# Patient Record
Sex: FEMALE | Race: WHITE
[De-identification: names, ages, dates, MRNs, and addresses within clinical notes are randomized per-mention and may not be internally consistent; named-entity substitution may affect disease eponyms.]

---

## 2020-01-21 PROBLEM — Z00.00 ENCOUNTER FOR PREVENTIVE HEALTH EXAMINATION: Status: ACTIVE | Noted: 2020-01-21

## 2020-02-11 ENCOUNTER — NON-APPOINTMENT (OUTPATIENT)
Age: 30
End: 2020-02-11

## 2020-02-11 ENCOUNTER — APPOINTMENT (OUTPATIENT)
Dept: OBGYN | Facility: CLINIC | Age: 30
End: 2020-02-11
Payer: COMMERCIAL

## 2020-02-11 VITALS
WEIGHT: 169 LBS | OXYGEN SATURATION: 98 % | DIASTOLIC BLOOD PRESSURE: 80 MMHG | BODY MASS INDEX: 27.16 KG/M2 | HEIGHT: 66 IN | SYSTOLIC BLOOD PRESSURE: 118 MMHG | RESPIRATION RATE: 18 BRPM | HEART RATE: 71 BPM

## 2020-02-11 PROCEDURE — 81003 URINALYSIS AUTO W/O SCOPE: CPT | Mod: QW

## 2020-02-11 PROCEDURE — 0501F PRENATAL FLOW SHEET: CPT

## 2020-02-12 LAB
APPEARANCE: ABNORMAL
BACTERIA: ABNORMAL
BILIRUB UR QL STRIP: NORMAL
BILIRUBIN URINE: NEGATIVE
BLOOD URINE: NEGATIVE
CLARITY UR: CLEAR
COLLECTION METHOD: NORMAL
COLOR: NORMAL
GLUCOSE QUALITATIVE U: NEGATIVE
GLUCOSE UR-MCNC: NORMAL
HCG UR QL: 0.2 EU/DL
HGB UR QL STRIP.AUTO: NORMAL
HYALINE CASTS: 0 /LPF
KETONES UR-MCNC: NORMAL
KETONES URINE: NEGATIVE
LEUKOCYTE ESTERASE UR QL STRIP: NORMAL
LEUKOCYTE ESTERASE URINE: NEGATIVE
MICROSCOPIC-UA: NORMAL
NITRITE UR QL STRIP: NORMAL
NITRITE URINE: NEGATIVE
PH UR STRIP: 6
PH URINE: 6
PROT UR STRIP-MCNC: NORMAL
PROTEIN URINE: NEGATIVE
RED BLOOD CELLS URINE: 4 /HPF
SP GR UR STRIP: 1.01
SPECIFIC GRAVITY URINE: 1.01
SQUAMOUS EPITHELIAL CELLS: 2 /HPF
UROBILINOGEN URINE: NORMAL
WHITE BLOOD CELLS URINE: 31 /HPF

## 2020-02-13 ENCOUNTER — TRANSCRIPTION ENCOUNTER (OUTPATIENT)
Age: 30
End: 2020-02-13

## 2020-02-13 LAB — BACTERIA UR CULT: NORMAL

## 2020-02-14 ENCOUNTER — NON-APPOINTMENT (OUTPATIENT)
Age: 30
End: 2020-02-14

## 2020-02-25 ENCOUNTER — NON-APPOINTMENT (OUTPATIENT)
Age: 30
End: 2020-02-25

## 2020-02-25 ENCOUNTER — APPOINTMENT (OUTPATIENT)
Dept: OBGYN | Facility: CLINIC | Age: 30
End: 2020-02-25
Payer: COMMERCIAL

## 2020-02-25 VITALS
HEART RATE: 80 BPM | HEIGHT: 67 IN | WEIGHT: 172.18 LBS | DIASTOLIC BLOOD PRESSURE: 80 MMHG | BODY MASS INDEX: 27.02 KG/M2 | SYSTOLIC BLOOD PRESSURE: 120 MMHG

## 2020-02-25 LAB
BILIRUB UR QL STRIP: NORMAL
CLARITY UR: CLEAR
COLLECTION METHOD: NORMAL
GLUCOSE UR-MCNC: NORMAL
HCG UR QL: 0.2 EU/DL
HGB UR QL STRIP.AUTO: NORMAL
KETONES UR-MCNC: NORMAL
LEUKOCYTE ESTERASE UR QL STRIP: NORMAL
NITRITE UR QL STRIP: NORMAL
PH UR STRIP: 6
PROT UR STRIP-MCNC: NORMAL
SP GR UR STRIP: 1.03

## 2020-02-25 PROCEDURE — 36415 COLL VENOUS BLD VENIPUNCTURE: CPT

## 2020-02-25 PROCEDURE — 81003 URINALYSIS AUTO W/O SCOPE: CPT | Mod: QW

## 2020-02-25 PROCEDURE — 0502F SUBSEQUENT PRENATAL CARE: CPT

## 2020-02-26 ENCOUNTER — TRANSCRIPTION ENCOUNTER (OUTPATIENT)
Age: 30
End: 2020-02-26

## 2020-02-26 LAB
BASOPHILS # BLD AUTO: 0.04 K/UL
BASOPHILS NFR BLD AUTO: 0.4 %
EOSINOPHIL # BLD AUTO: 0.14 K/UL
EOSINOPHIL NFR BLD AUTO: 1.3 %
GLUCOSE 1H P 50 G GLC PO SERPL-MCNC: 97 MG/DL
HCT VFR BLD CALC: 34.5 %
HCV AB SER QL: NONREACTIVE
HCV S/CO RATIO: 0.15 S/CO
HGB BLD-MCNC: 11.3 G/DL
IMM GRANULOCYTES NFR BLD AUTO: 0.3 %
LYMPHOCYTES # BLD AUTO: 1.74 K/UL
LYMPHOCYTES NFR BLD AUTO: 16.1 %
MAN DIFF?: NORMAL
MCHC RBC-ENTMCNC: 31.4 PG
MCHC RBC-ENTMCNC: 32.8 GM/DL
MCV RBC AUTO: 95.8 FL
MONOCYTES # BLD AUTO: 0.67 K/UL
MONOCYTES NFR BLD AUTO: 6.2 %
NEUTROPHILS # BLD AUTO: 8.18 K/UL
NEUTROPHILS NFR BLD AUTO: 75.7 %
PLATELET # BLD AUTO: 235 K/UL
RBC # BLD: 3.6 M/UL
RBC # FLD: 12.7 %
WBC # FLD AUTO: 10.8 K/UL

## 2020-03-24 ENCOUNTER — NON-APPOINTMENT (OUTPATIENT)
Age: 30
End: 2020-03-24

## 2020-03-24 ENCOUNTER — APPOINTMENT (OUTPATIENT)
Dept: OBGYN | Facility: CLINIC | Age: 30
End: 2020-03-24
Payer: COMMERCIAL

## 2020-03-24 VITALS
RESPIRATION RATE: 18 BRPM | HEIGHT: 67 IN | SYSTOLIC BLOOD PRESSURE: 126 MMHG | OXYGEN SATURATION: 98 % | BODY MASS INDEX: 27.47 KG/M2 | DIASTOLIC BLOOD PRESSURE: 70 MMHG | WEIGHT: 175 LBS

## 2020-03-24 PROCEDURE — 81003 URINALYSIS AUTO W/O SCOPE: CPT | Mod: QW

## 2020-03-24 PROCEDURE — 0502F SUBSEQUENT PRENATAL CARE: CPT

## 2020-03-25 LAB
BILIRUB UR QL STRIP: NORMAL
CLARITY UR: CLEAR
COLLECTION METHOD: NORMAL
GLUCOSE UR-MCNC: NORMAL
HCG UR QL: 0.2 EU/DL
HGB UR QL STRIP.AUTO: NORMAL
KETONES UR-MCNC: NORMAL
LEUKOCYTE ESTERASE UR QL STRIP: NORMAL
NITRITE UR QL STRIP: NORMAL
PH UR STRIP: 6.5
PROT UR STRIP-MCNC: NORMAL
SP GR UR STRIP: 1.02

## 2020-04-07 ENCOUNTER — NON-APPOINTMENT (OUTPATIENT)
Age: 30
End: 2020-04-07

## 2020-04-07 ENCOUNTER — APPOINTMENT (OUTPATIENT)
Dept: ANTEPARTUM | Facility: CLINIC | Age: 30
End: 2020-04-07
Payer: COMMERCIAL

## 2020-04-07 ENCOUNTER — APPOINTMENT (OUTPATIENT)
Dept: OBGYN | Facility: CLINIC | Age: 30
End: 2020-04-07
Payer: COMMERCIAL

## 2020-04-07 ENCOUNTER — ASOB RESULT (OUTPATIENT)
Age: 30
End: 2020-04-07

## 2020-04-07 VITALS
DIASTOLIC BLOOD PRESSURE: 82 MMHG | SYSTOLIC BLOOD PRESSURE: 116 MMHG | WEIGHT: 174.8 LBS | HEIGHT: 67 IN | BODY MASS INDEX: 27.44 KG/M2

## 2020-04-07 VITALS — TEMPERATURE: 98.7 F

## 2020-04-07 PROCEDURE — 81003 URINALYSIS AUTO W/O SCOPE: CPT | Mod: QW

## 2020-04-07 PROCEDURE — 0502F SUBSEQUENT PRENATAL CARE: CPT

## 2020-04-07 PROCEDURE — 76816 OB US FOLLOW-UP PER FETUS: CPT

## 2020-04-22 ENCOUNTER — NON-APPOINTMENT (OUTPATIENT)
Age: 30
End: 2020-04-22

## 2020-04-22 ENCOUNTER — APPOINTMENT (OUTPATIENT)
Dept: OBGYN | Facility: CLINIC | Age: 30
End: 2020-04-22
Payer: COMMERCIAL

## 2020-04-22 VITALS
HEIGHT: 67 IN | WEIGHT: 178 LBS | DIASTOLIC BLOOD PRESSURE: 72 MMHG | BODY MASS INDEX: 27.94 KG/M2 | RESPIRATION RATE: 18 BRPM | OXYGEN SATURATION: 98 % | SYSTOLIC BLOOD PRESSURE: 108 MMHG

## 2020-04-22 LAB
BILIRUB UR QL STRIP: NORMAL
CLARITY UR: CLEAR
COLLECTION METHOD: NORMAL
GLUCOSE UR-MCNC: NORMAL
HCG UR QL: 0.2 EU/DL
HGB UR QL STRIP.AUTO: NORMAL
KETONES UR-MCNC: NORMAL
LEUKOCYTE ESTERASE UR QL STRIP: NORMAL
NITRITE UR QL STRIP: NORMAL
PH UR STRIP: 6
PROT UR STRIP-MCNC: NORMAL
SP GR UR STRIP: 1.02

## 2020-04-22 PROCEDURE — 0502F SUBSEQUENT PRENATAL CARE: CPT

## 2020-04-22 PROCEDURE — 81003 URINALYSIS AUTO W/O SCOPE: CPT | Mod: QW

## 2020-05-07 ENCOUNTER — APPOINTMENT (OUTPATIENT)
Dept: OBGYN | Facility: CLINIC | Age: 30
End: 2020-05-07
Payer: COMMERCIAL

## 2020-05-07 ENCOUNTER — NON-APPOINTMENT (OUTPATIENT)
Age: 30
End: 2020-05-07

## 2020-05-07 VITALS
HEIGHT: 67 IN | BODY MASS INDEX: 28.09 KG/M2 | SYSTOLIC BLOOD PRESSURE: 110 MMHG | DIASTOLIC BLOOD PRESSURE: 64 MMHG | WEIGHT: 179 LBS

## 2020-05-07 LAB
BILIRUB UR QL STRIP: NORMAL
CLARITY UR: CLEAR
COLLECTION METHOD: NORMAL
GLUCOSE UR-MCNC: NORMAL
HCG UR QL: 0.2 EU/DL
HGB UR QL STRIP.AUTO: NORMAL
KETONES UR-MCNC: NORMAL
LEUKOCYTE ESTERASE UR QL STRIP: NORMAL
NITRITE UR QL STRIP: NORMAL
PH UR STRIP: 5.5
PROT UR STRIP-MCNC: NORMAL
SP GR UR STRIP: 1.03

## 2020-05-07 PROCEDURE — 0502F SUBSEQUENT PRENATAL CARE: CPT

## 2020-05-07 PROCEDURE — 81003 URINALYSIS AUTO W/O SCOPE: CPT | Mod: QW

## 2020-05-07 PROCEDURE — 36415 COLL VENOUS BLD VENIPUNCTURE: CPT

## 2020-05-08 LAB
BASOPHILS # BLD AUTO: 0.04 K/UL
BASOPHILS NFR BLD AUTO: 0.4 %
EOSINOPHIL # BLD AUTO: 0.07 K/UL
EOSINOPHIL NFR BLD AUTO: 0.7 %
HCT VFR BLD CALC: 36.8 %
HGB BLD-MCNC: 11.1 G/DL
HIV1+2 AB SPEC QL IA.RAPID: NONREACTIVE
IMM GRANULOCYTES NFR BLD AUTO: 0.4 %
LYMPHOCYTES # BLD AUTO: 1.89 K/UL
LYMPHOCYTES NFR BLD AUTO: 19.7 %
MAN DIFF?: NORMAL
MCHC RBC-ENTMCNC: 28.5 PG
MCHC RBC-ENTMCNC: 30.2 GM/DL
MCV RBC AUTO: 94.4 FL
MONOCYTES # BLD AUTO: 0.45 K/UL
MONOCYTES NFR BLD AUTO: 4.7 %
NEUTROPHILS # BLD AUTO: 7.08 K/UL
NEUTROPHILS NFR BLD AUTO: 74.1 %
PLATELET # BLD AUTO: 244 K/UL
RBC # BLD: 3.9 M/UL
RBC # FLD: 13.1 %
WBC # FLD AUTO: 9.57 K/UL

## 2020-05-11 LAB
GP B STREP DNA SPEC QL NAA+PROBE: NORMAL
GP B STREP DNA SPEC QL NAA+PROBE: NOT DETECTED
SOURCE GBS: NORMAL

## 2020-05-15 ENCOUNTER — APPOINTMENT (OUTPATIENT)
Dept: OBGYN | Facility: CLINIC | Age: 30
End: 2020-05-15
Payer: COMMERCIAL

## 2020-05-15 ENCOUNTER — NON-APPOINTMENT (OUTPATIENT)
Age: 30
End: 2020-05-15

## 2020-05-15 VITALS
SYSTOLIC BLOOD PRESSURE: 110 MMHG | BODY MASS INDEX: 28.72 KG/M2 | DIASTOLIC BLOOD PRESSURE: 70 MMHG | WEIGHT: 183 LBS | HEIGHT: 67 IN

## 2020-05-15 DIAGNOSIS — Z34.03 ENCOUNTER FOR SUPERVISION OF NORMAL FIRST PREGNANCY, THIRD TRIMESTER: ICD-10-CM

## 2020-05-15 PROCEDURE — 0502F SUBSEQUENT PRENATAL CARE: CPT

## 2020-05-15 PROCEDURE — 81003 URINALYSIS AUTO W/O SCOPE: CPT | Mod: QW

## 2020-05-21 ENCOUNTER — NON-APPOINTMENT (OUTPATIENT)
Age: 30
End: 2020-05-21

## 2020-05-21 ENCOUNTER — APPOINTMENT (OUTPATIENT)
Dept: OBGYN | Facility: CLINIC | Age: 30
End: 2020-05-21
Payer: COMMERCIAL

## 2020-05-21 VITALS
SYSTOLIC BLOOD PRESSURE: 122 MMHG | DIASTOLIC BLOOD PRESSURE: 70 MMHG | BODY MASS INDEX: 28.72 KG/M2 | HEIGHT: 67 IN | WEIGHT: 183 LBS

## 2020-05-21 PROCEDURE — 0502F SUBSEQUENT PRENATAL CARE: CPT

## 2020-05-28 ENCOUNTER — APPOINTMENT (OUTPATIENT)
Dept: OBGYN | Facility: CLINIC | Age: 30
End: 2020-05-28
Payer: COMMERCIAL

## 2020-05-28 ENCOUNTER — NON-APPOINTMENT (OUTPATIENT)
Age: 30
End: 2020-05-28

## 2020-05-28 VITALS
BODY MASS INDEX: 28.88 KG/M2 | SYSTOLIC BLOOD PRESSURE: 110 MMHG | DIASTOLIC BLOOD PRESSURE: 80 MMHG | HEIGHT: 67 IN | WEIGHT: 184 LBS

## 2020-05-28 PROCEDURE — 0502F SUBSEQUENT PRENATAL CARE: CPT

## 2020-06-03 ENCOUNTER — INPATIENT (INPATIENT)
Facility: HOSPITAL | Age: 30
LOS: 1 days | Discharge: HOME | End: 2020-06-05
Attending: OBSTETRICS & GYNECOLOGY | Admitting: OBSTETRICS & GYNECOLOGY
Payer: COMMERCIAL

## 2020-06-03 ENCOUNTER — OUTPATIENT (OUTPATIENT)
Dept: INPATIENT UNIT | Facility: HOSPITAL | Age: 30
LOS: 1 days | Discharge: HOME | End: 2020-06-03
Payer: COMMERCIAL

## 2020-06-03 VITALS
TEMPERATURE: 99 F | SYSTOLIC BLOOD PRESSURE: 118 MMHG | DIASTOLIC BLOOD PRESSURE: 65 MMHG | RESPIRATION RATE: 16 BRPM | HEART RATE: 92 BPM

## 2020-06-03 DIAGNOSIS — O26.899 OTHER SPECIFIED PREGNANCY RELATED CONDITIONS, UNSPECIFIED TRIMESTER: ICD-10-CM

## 2020-06-03 DIAGNOSIS — O47.9 FALSE LABOR, UNSPECIFIED: ICD-10-CM

## 2020-06-03 LAB
BASOPHILS # BLD AUTO: 0.06 K/UL — SIGNIFICANT CHANGE UP (ref 0–0.2)
BASOPHILS NFR BLD AUTO: 0.3 % — SIGNIFICANT CHANGE UP (ref 0–2)
EOSINOPHIL # BLD AUTO: 0 K/UL — SIGNIFICANT CHANGE UP (ref 0–0.5)
EOSINOPHIL NFR BLD AUTO: 0 % — SIGNIFICANT CHANGE UP (ref 0–6)
HCT VFR BLD CALC: 35.1 % — SIGNIFICANT CHANGE UP (ref 34.5–45)
HGB BLD-MCNC: 11.6 G/DL — SIGNIFICANT CHANGE UP (ref 11.5–15.5)
IMM GRANULOCYTES NFR BLD AUTO: 0.5 % — SIGNIFICANT CHANGE UP (ref 0–1.5)
LYMPHOCYTES # BLD AUTO: 1.45 K/UL — SIGNIFICANT CHANGE UP (ref 1–3.3)
LYMPHOCYTES # BLD AUTO: 7.6 % — LOW (ref 13–44)
MCHC RBC-ENTMCNC: 28.2 PG — SIGNIFICANT CHANGE UP (ref 27–34)
MCHC RBC-ENTMCNC: 33 GM/DL — SIGNIFICANT CHANGE UP (ref 32–36)
MCV RBC AUTO: 85.4 FL — SIGNIFICANT CHANGE UP (ref 80–100)
MONOCYTES # BLD AUTO: 0.66 K/UL — SIGNIFICANT CHANGE UP (ref 0–0.9)
MONOCYTES NFR BLD AUTO: 3.4 % — SIGNIFICANT CHANGE UP (ref 2–14)
NEUTROPHILS # BLD AUTO: 16.9 K/UL — HIGH (ref 1.8–7.4)
NEUTROPHILS NFR BLD AUTO: 88.2 % — HIGH (ref 43–77)
PLATELET # BLD AUTO: 259 K/UL — SIGNIFICANT CHANGE UP (ref 150–400)
RBC # BLD: 4.11 M/UL — SIGNIFICANT CHANGE UP (ref 3.8–5.2)
RBC # FLD: 13.3 % — SIGNIFICANT CHANGE UP (ref 10.3–14.5)
SARS-COV-2 RNA SPEC QL NAA+PROBE: SIGNIFICANT CHANGE UP
T PALLIDUM AB TITR SER: NEGATIVE — SIGNIFICANT CHANGE UP
WBC # BLD: 19.16 K/UL — HIGH (ref 3.8–10.5)
WBC # FLD AUTO: 19.16 K/UL — HIGH (ref 3.8–10.5)

## 2020-06-03 PROCEDURE — 86901 BLOOD TYPING SEROLOGIC RH(D): CPT

## 2020-06-03 PROCEDURE — G0463: CPT

## 2020-06-03 PROCEDURE — 36415 COLL VENOUS BLD VENIPUNCTURE: CPT

## 2020-06-03 PROCEDURE — 85018 HEMOGLOBIN: CPT

## 2020-06-03 PROCEDURE — 59050 FETAL MONITOR W/REPORT: CPT

## 2020-06-03 PROCEDURE — 86780 TREPONEMA PALLIDUM: CPT

## 2020-06-03 PROCEDURE — 86900 BLOOD TYPING SEROLOGIC ABO: CPT

## 2020-06-03 PROCEDURE — 59400 OBSTETRICAL CARE: CPT

## 2020-06-03 PROCEDURE — 86850 RBC ANTIBODY SCREEN: CPT

## 2020-06-03 PROCEDURE — 85014 HEMATOCRIT: CPT

## 2020-06-03 PROCEDURE — U0003: CPT

## 2020-06-03 PROCEDURE — 85025 COMPLETE CBC W/AUTO DIFF WBC: CPT

## 2020-06-03 PROCEDURE — 59410 OBSTETRICAL CARE: CPT

## 2020-06-03 RX ORDER — IBUPROFEN 200 MG
600 TABLET ORAL EVERY 6 HOURS
Refills: 0 | Status: COMPLETED | OUTPATIENT
Start: 2020-06-03 | End: 2021-05-02

## 2020-06-03 RX ORDER — SIMETHICONE 80 MG/1
80 TABLET, CHEWABLE ORAL EVERY 4 HOURS
Refills: 0 | Status: DISCONTINUED | OUTPATIENT
Start: 2020-06-03 | End: 2020-06-05

## 2020-06-03 RX ORDER — SODIUM CHLORIDE 9 MG/ML
3 INJECTION INTRAMUSCULAR; INTRAVENOUS; SUBCUTANEOUS EVERY 8 HOURS
Refills: 0 | Status: DISCONTINUED | OUTPATIENT
Start: 2020-06-03 | End: 2020-06-03

## 2020-06-03 RX ORDER — MAGNESIUM HYDROXIDE 400 MG/1
30 TABLET, CHEWABLE ORAL
Refills: 0 | Status: DISCONTINUED | OUTPATIENT
Start: 2020-06-03 | End: 2020-06-05

## 2020-06-03 RX ORDER — TETANUS TOXOID, REDUCED DIPHTHERIA TOXOID AND ACELLULAR PERTUSSIS VACCINE, ADSORBED 5; 2.5; 8; 8; 2.5 [IU]/.5ML; [IU]/.5ML; UG/.5ML; UG/.5ML; UG/.5ML
0.5 SUSPENSION INTRAMUSCULAR ONCE
Refills: 0 | Status: DISCONTINUED | OUTPATIENT
Start: 2020-06-03 | End: 2020-06-05

## 2020-06-03 RX ORDER — OXYCODONE HYDROCHLORIDE 5 MG/1
5 TABLET ORAL ONCE
Refills: 0 | Status: DISCONTINUED | OUTPATIENT
Start: 2020-06-03 | End: 2020-06-05

## 2020-06-03 RX ORDER — LANOLIN
1 OINTMENT (GRAM) TOPICAL EVERY 6 HOURS
Refills: 0 | Status: DISCONTINUED | OUTPATIENT
Start: 2020-06-03 | End: 2020-06-05

## 2020-06-03 RX ORDER — ACETAMINOPHEN 500 MG
975 TABLET ORAL
Refills: 0 | Status: DISCONTINUED | OUTPATIENT
Start: 2020-06-03 | End: 2020-06-05

## 2020-06-03 RX ORDER — OXYTOCIN 10 UNIT/ML
333.33 VIAL (ML) INJECTION
Qty: 20 | Refills: 0 | Status: DISCONTINUED | OUTPATIENT
Start: 2020-06-03 | End: 2020-06-05

## 2020-06-03 RX ORDER — OXYCODONE HYDROCHLORIDE 5 MG/1
5 TABLET ORAL
Refills: 0 | Status: DISCONTINUED | OUTPATIENT
Start: 2020-06-03 | End: 2020-06-05

## 2020-06-03 RX ORDER — HYDROCORTISONE 1 %
1 OINTMENT (GRAM) TOPICAL EVERY 6 HOURS
Refills: 0 | Status: DISCONTINUED | OUTPATIENT
Start: 2020-06-03 | End: 2020-06-05

## 2020-06-03 RX ORDER — KETOROLAC TROMETHAMINE 30 MG/ML
30 SYRINGE (ML) INJECTION ONCE
Refills: 0 | Status: DISCONTINUED | OUTPATIENT
Start: 2020-06-03 | End: 2020-06-05

## 2020-06-03 RX ORDER — PRAMOXINE HYDROCHLORIDE 150 MG/15G
1 AEROSOL, FOAM RECTAL EVERY 4 HOURS
Refills: 0 | Status: DISCONTINUED | OUTPATIENT
Start: 2020-06-03 | End: 2020-06-05

## 2020-06-03 RX ORDER — AER TRAVELER 0.5 G/1
1 SOLUTION RECTAL; TOPICAL EVERY 4 HOURS
Refills: 0 | Status: DISCONTINUED | OUTPATIENT
Start: 2020-06-03 | End: 2020-06-05

## 2020-06-03 RX ORDER — IBUPROFEN 200 MG
600 TABLET ORAL EVERY 6 HOURS
Refills: 0 | Status: DISCONTINUED | OUTPATIENT
Start: 2020-06-03 | End: 2020-06-05

## 2020-06-03 RX ORDER — BENZOCAINE 10 %
1 GEL (GRAM) MUCOUS MEMBRANE EVERY 6 HOURS
Refills: 0 | Status: DISCONTINUED | OUTPATIENT
Start: 2020-06-03 | End: 2020-06-05

## 2020-06-03 RX ORDER — SODIUM CHLORIDE 9 MG/ML
1000 INJECTION, SOLUTION INTRAVENOUS
Refills: 0 | Status: DISCONTINUED | OUTPATIENT
Start: 2020-06-03 | End: 2020-06-03

## 2020-06-03 RX ORDER — DIPHENHYDRAMINE HCL 50 MG
25 CAPSULE ORAL EVERY 6 HOURS
Refills: 0 | Status: DISCONTINUED | OUTPATIENT
Start: 2020-06-03 | End: 2020-06-05

## 2020-06-03 RX ORDER — DIBUCAINE 1 %
1 OINTMENT (GRAM) RECTAL EVERY 6 HOURS
Refills: 0 | Status: DISCONTINUED | OUTPATIENT
Start: 2020-06-03 | End: 2020-06-05

## 2020-06-03 RX ORDER — CITRIC ACID/SODIUM CITRATE 300-500 MG
30 SOLUTION, ORAL ORAL ONCE
Refills: 0 | Status: DISCONTINUED | OUTPATIENT
Start: 2020-06-03 | End: 2020-06-03

## 2020-06-03 RX ADMIN — Medication 975 MILLIGRAM(S): at 15:49

## 2020-06-03 RX ADMIN — Medication 1 TABLET(S): at 12:46

## 2020-06-03 RX ADMIN — Medication 1000 MILLIUNIT(S)/MIN: at 05:21

## 2020-06-03 RX ADMIN — Medication 975 MILLIGRAM(S): at 20:52

## 2020-06-03 RX ADMIN — Medication 600 MILLIGRAM(S): at 23:54

## 2020-06-03 RX ADMIN — Medication 600 MILLIGRAM(S): at 12:53

## 2020-06-04 ENCOUNTER — TRANSCRIPTION ENCOUNTER (OUTPATIENT)
Age: 30
End: 2020-06-04

## 2020-06-04 LAB
HCT VFR BLD CALC: 31.5 % — LOW (ref 34.5–45)
HGB BLD-MCNC: 10 G/DL — LOW (ref 11.5–15.5)

## 2020-06-04 RX ADMIN — Medication 975 MILLIGRAM(S): at 21:24

## 2020-06-04 RX ADMIN — Medication 600 MILLIGRAM(S): at 05:52

## 2020-06-04 RX ADMIN — Medication 1 TABLET(S): at 12:55

## 2020-06-04 NOTE — PROGRESS NOTE ADULT - SUBJECTIVE AND OBJECTIVE BOX
Patient is a 30yo  now PPD#1 s/p spontaneous vaginal delivery    S:    No acute events overnight.  Pt seen and examined at bedside. Pt doing well.  Has no complaints.  Pain well controlled on current regiment.  Pt ambulating, tolerating PO, voiding w/o difficulty, +flatus/-BM.    O:    T(C): 36.9 (20 @ 23:58), Max: 37.1 (20 @ 09:34)  HR: 70 (20 @ 23:58) (70 - 96)  BP: 115/77 (20 @ 23:58) (105/67 - 122/80)  RR: 18 (20 @ 23:58) (16 - 18)  SpO2: 96% (20 @ 23:58) (96% - 98%)    Physical Exam  Breast: NT, non-engorged  Abdomen:  soft, NT, ND, BS+  Uterus:  firm below umbilicus  VE:  expectant lochia  Ext:  NT, nonedematous                          11.6   19.16 )-----------( 259      ( 2020 04:20 )             35.1 Patient is a 28yo  now PPD#1 s/p spontaneous vaginal delivery.    S:    No acute events overnight.  Pt seen and examined at bedside. Pt doing well.  Has no complaints.  Pain well controlled on current regiment.  Pt ambulating, tolerating PO, voiding w/o difficulty, +flatus/-BM.    O:    T(C): 36.9 (20 @ 23:58), Max: 37.1 (20 @ 09:34)  HR: 70 (20 @ 23:58) (70 - 96)  BP: 115/77 (20 @ 23:58) (105/67 - 122/80)  RR: 18 (20 @ 23:58) (16 - 18)  SpO2: 96% (20 @ 23:58) (96% - 98%)    Physical Exam  Breast: NT, non-engorged  Abdomen:  soft, NT, ND, BS+  Uterus:  firm below umbilicus  VE:  normal lochia  Ext:  NT, nonedematous                          11.6   19.16 )-----------( 259      ( 2020 04:20 )             35.1

## 2020-06-04 NOTE — PROGRESS NOTE ADULT - ATTENDING COMMENTS
Patient seen and examined at bedside. Agree with above assessment and exam. Doing well. Tolerating diet and ambulation without difficulties. H/H pp stable. Baby boy stable-- pending circ to be done today. D/C patient home tomorrow if stable.

## 2020-06-04 NOTE — PROGRESS NOTE ADULT - SUBJECTIVE AND OBJECTIVE BOX
S: Patient doing well. Minimal lochia. No complaints.     O: Vital Signs Last 24 Hrs  T(C): 37.2 (2020 08:46), Max: 37.2 (2020 08:46)  T(F): 98.9 (2020 08:46), Max: 98.9 (2020 08:46)  HR: 68 (2020 08:46) (68 - 89)  BP: 116/80 (2020 08:46) (105/67 - 116/80)  BP(mean): 85 (2020 09:34) (85 - 85)  RR: 17 (2020 08:46) (17 - 18)  SpO2: 99% (2020 08:46) (96% - 99%)    Gen: NAD  Abd: soft, NT, ND, fundus firm below umbilicus  Perineum: clean,dry and intact    Ext: no tenderness    Labs:                        10.0   x     )-----------( x        ( 2020 08:20 )             31.5        Rubella status:    A: 29y PPD# 1 s/p      Doing well    Plan: continue progressive  care    \par

## 2020-06-05 ENCOUNTER — APPOINTMENT (OUTPATIENT)
Dept: OBGYN | Facility: CLINIC | Age: 30
End: 2020-06-05

## 2020-06-05 VITALS
DIASTOLIC BLOOD PRESSURE: 72 MMHG | TEMPERATURE: 98 F | HEART RATE: 69 BPM | OXYGEN SATURATION: 99 % | SYSTOLIC BLOOD PRESSURE: 108 MMHG | RESPIRATION RATE: 17 BRPM

## 2020-06-05 RX ORDER — IBUPROFEN 200 MG
1 TABLET ORAL
Qty: 28 | Refills: 0
Start: 2020-06-05 | End: 2020-06-11

## 2020-06-05 RX ADMIN — Medication 600 MILLIGRAM(S): at 00:12

## 2020-06-05 RX ADMIN — Medication 600 MILLIGRAM(S): at 06:29

## 2020-06-05 NOTE — DISCHARGE NOTE OB - PATIENT PORTAL LINK FT
You can access the FollowMyHealth Patient Portal offered by [facility] by registering at the following website: [email or web address]. By joining [employer]’s FollowMyHealth portal, you will also be able to view your health information using other applications (apps) compatible with our system.

## 2020-06-05 NOTE — PROGRESS NOTE ADULT - ASSESSMENT
A/P:  Patient is a 28yo  now PPD#2 s/p spontaneous vaginal delivery.  -Stable  -Voiding, tolerating PO, bowel function nml   -Advance care as tolerated   -Continue routine postpartum/postoperative care and education.  -Pt encouraged to increase ambulation and PO intake  -D/C today

## 2020-06-05 NOTE — DISCHARGE NOTE OB - CARE PROVIDER_API CALL
Tyler Stephen OB-GYN  [facility] [address] [place] [address]  Phone: [phone]  Fax: [phone]  Follow Up Time: 1 month

## 2020-06-05 NOTE — PROGRESS NOTE ADULT - SUBJECTIVE AND OBJECTIVE BOX
Patient is a 30yo  now PPD#2 s/p spontaneous vaginal delivery.    S:    No acute events overnight.  Pt seen and examined at bedside. Pt doing well.  Has no complaints.  Pain well controlled on current regiment.  Pt ambulating, tolerating PO, voiding w/o difficulty, +flatus/-BM.    O:    T(C): 37.1 (20 @ 21:35), Max: 37.2 (20 @ 08:46)  HR: 72 (20 @ 21:35) (68 - 72)  BP: 114/77 (20 @ 21:35) (114/77 - 116/80)  RR: 18 (20 @ 21:35) (17 - 18)  SpO2: 98% (20 @ 21:35) (98% - 99%)    Physical Exam  Breast: NT, non-engorged  Abdomen:  soft, NT, ND, BS+  Uterus:  firm below umbilicus  VE:  expectant lochia  Ext:  NT, nonedematous                          10.0   x     )-----------( x        ( 2020 08:20 )             31.5

## 2020-06-09 DIAGNOSIS — Z3A.40 40 WEEKS GESTATION OF PREGNANCY: ICD-10-CM

## 2020-06-09 DIAGNOSIS — O48.0 POST-TERM PREGNANCY: ICD-10-CM

## 2020-06-09 DIAGNOSIS — Z34.93 ENCOUNTER FOR SUPERVISION OF NORMAL PREGNANCY, UNSPECIFIED, THIRD TRIMESTER: ICD-10-CM

## 2020-06-11 DIAGNOSIS — R39.9 UNSPECIFIED SYMPTOMS AND SIGNS INVOLVING THE GENITOURINARY SYSTEM: ICD-10-CM

## 2020-06-11 RX ORDER — CEPHALEXIN 500 MG/1
500 CAPSULE ORAL
Qty: 10 | Refills: 0 | Status: ACTIVE | COMMUNITY
Start: 2020-06-11 | End: 1900-01-01

## 2020-06-17 ENCOUNTER — APPOINTMENT (OUTPATIENT)
Dept: OBGYN | Facility: CLINIC | Age: 30
End: 2020-06-17
Payer: COMMERCIAL

## 2020-06-17 VITALS
WEIGHT: 161 LBS | DIASTOLIC BLOOD PRESSURE: 70 MMHG | HEIGHT: 67 IN | SYSTOLIC BLOOD PRESSURE: 112 MMHG | RESPIRATION RATE: 18 BRPM | BODY MASS INDEX: 25.27 KG/M2 | OXYGEN SATURATION: 98 %

## 2020-06-17 LAB
BILIRUB UR QL STRIP: NORMAL
GLUCOSE UR-MCNC: NORMAL
HCG UR QL: 0.2 EU/DL
HGB UR QL STRIP.AUTO: NORMAL
KETONES UR-MCNC: NORMAL
LEUKOCYTE ESTERASE UR QL STRIP: NORMAL
NITRITE UR QL STRIP: NORMAL
PH UR STRIP: 6
PROT UR STRIP-MCNC: NORMAL
SP GR UR STRIP: 1.02

## 2020-06-17 PROCEDURE — 81003 URINALYSIS AUTO W/O SCOPE: CPT | Mod: QW

## 2020-06-17 PROCEDURE — 0503F POSTPARTUM CARE VISIT: CPT

## 2020-06-17 NOTE — HISTORY OF PRESENT ILLNESS
[Delivery Date: ___] : on [unfilled] [Postpartum Follow Up] : postpartum follow up [Male] : Delivery History: baby boy [] : delivered by vaginal delivery [Breastfeeding] : currently nursing [Back to Normal] : is back to normal in size [Mild] : mild vaginal bleeding [Complications:___] : no complications [FreeTextEntry8] : Doing well. Normal lochia. Currently breastfeeding. She was treated for a UTI last week and reports symptoms have improved. Denies s/s pp depression. [de-identified] : 1. UA, UC sent 2. We discussed reversible methods of contraception at length including OCPs, ring, patch, Depo Provera, and LARCs. Patient will notify. 3. RTO 4wks [de-identified] : [place] scale 0/30

## 2020-06-18 LAB
APPEARANCE: CLEAR
BACTERIA: NEGATIVE
BILIRUBIN URINE: NEGATIVE
BLOOD URINE: ABNORMAL
COLOR: NORMAL
GLUCOSE QUALITATIVE U: NEGATIVE
HYALINE CASTS: 1 /LPF
KETONES URINE: NEGATIVE
LEUKOCYTE ESTERASE URINE: ABNORMAL
MICROSCOPIC-UA: NORMAL
NITRITE URINE: NEGATIVE
PH URINE: 6
PROTEIN URINE: NEGATIVE
RED BLOOD CELLS URINE: 2 /HPF
SPECIFIC GRAVITY URINE: 1.01
SQUAMOUS EPITHELIAL CELLS: 0 /HPF
UROBILINOGEN URINE: NORMAL
WHITE BLOOD CELLS URINE: 6 /HPF

## 2020-06-20 ENCOUNTER — TRANSCRIPTION ENCOUNTER (OUTPATIENT)
Age: 30
End: 2020-06-20

## 2020-06-20 LAB — BACTERIA UR CULT: NORMAL

## 2020-07-15 ENCOUNTER — APPOINTMENT (OUTPATIENT)
Dept: OBGYN | Facility: CLINIC | Age: 30
End: 2020-07-15
Payer: COMMERCIAL

## 2020-07-15 VITALS — DIASTOLIC BLOOD PRESSURE: 60 MMHG | SYSTOLIC BLOOD PRESSURE: 100 MMHG

## 2020-07-15 PROCEDURE — 0503F POSTPARTUM CARE VISIT: CPT

## 2020-07-15 NOTE — HISTORY OF PRESENT ILLNESS
[Delivery Date: ___] : on [unfilled] [Postpartum Follow Up] : postpartum follow up [] : delivered by vaginal delivery [Male] : Delivery History: baby boy [None] : no vaginal bleeding [Back to Normal] : is back to normal in size [Doing Well] : is doing well [No Sign of Infection] : is showing no signs of infection [Complications:___] : no complications [Breastfeeding] : not currently nursing [FreeTextEntry8] : Doing well. No vb. She is no longer breastfeeding due to low milk supply. Denies s/s pp depression. [de-identified] : 1. We discussed reversible methods of contraception at length including OCPs, ring, patch, Depo Provera, and LARCs. Patient's  will use condoms. She is not interested in hormonal contraception 2. RTO annual exam

## 2020-09-01 ENCOUNTER — TRANSCRIPTION ENCOUNTER (OUTPATIENT)
Age: 30
End: 2020-09-01

## 2020-09-10 ENCOUNTER — APPOINTMENT (OUTPATIENT)
Dept: OBGYN | Facility: CLINIC | Age: 30
End: 2020-09-10
Payer: COMMERCIAL

## 2020-09-10 VITALS
WEIGHT: 157 LBS | BODY MASS INDEX: 24.64 KG/M2 | SYSTOLIC BLOOD PRESSURE: 100 MMHG | DIASTOLIC BLOOD PRESSURE: 70 MMHG | HEIGHT: 67 IN

## 2020-09-10 PROCEDURE — 99395 PREV VISIT EST AGE 18-39: CPT

## 2021-08-03 ENCOUNTER — TRANSCRIPTION ENCOUNTER (OUTPATIENT)
Age: 31
End: 2021-08-03

## 2021-09-27 ENCOUNTER — APPOINTMENT (OUTPATIENT)
Dept: OBGYN | Facility: CLINIC | Age: 31
End: 2021-09-27
Payer: COMMERCIAL

## 2021-09-27 VITALS
BODY MASS INDEX: 23.54 KG/M2 | DIASTOLIC BLOOD PRESSURE: 76 MMHG | SYSTOLIC BLOOD PRESSURE: 116 MMHG | WEIGHT: 150 LBS | HEIGHT: 67 IN

## 2021-09-27 PROCEDURE — 99395 PREV VISIT EST AGE 18-39: CPT

## 2021-09-28 LAB — HPV HIGH+LOW RISK DNA PNL CVX: NOT DETECTED

## 2021-09-30 ENCOUNTER — NON-APPOINTMENT (OUTPATIENT)
Age: 31
End: 2021-09-30

## 2022-05-16 ENCOUNTER — TRANSCRIPTION ENCOUNTER (OUTPATIENT)
Age: 32
End: 2022-05-16

## 2022-06-02 ENCOUNTER — APPOINTMENT (OUTPATIENT)
Dept: OBGYN | Facility: CLINIC | Age: 32
End: 2022-06-02
Payer: COMMERCIAL

## 2022-06-02 VITALS
SYSTOLIC BLOOD PRESSURE: 116 MMHG | BODY MASS INDEX: 23.39 KG/M2 | WEIGHT: 149 LBS | HEIGHT: 67 IN | DIASTOLIC BLOOD PRESSURE: 72 MMHG

## 2022-06-02 LAB
HCG UR QL: POSITIVE
QUALITY CONTROL: YES

## 2022-06-02 PROCEDURE — 81025 URINE PREGNANCY TEST: CPT

## 2022-06-02 PROCEDURE — 99214 OFFICE O/P EST MOD 30 MIN: CPT

## 2022-06-02 PROCEDURE — 76830 TRANSVAGINAL US NON-OB: CPT

## 2022-06-13 ENCOUNTER — APPOINTMENT (OUTPATIENT)
Dept: OBGYN | Facility: CLINIC | Age: 32
End: 2022-06-13
Payer: COMMERCIAL

## 2022-06-13 ENCOUNTER — NON-APPOINTMENT (OUTPATIENT)
Age: 32
End: 2022-06-13

## 2022-06-13 VITALS — BODY MASS INDEX: 23.65 KG/M2 | SYSTOLIC BLOOD PRESSURE: 108 MMHG | DIASTOLIC BLOOD PRESSURE: 74 MMHG | WEIGHT: 151 LBS

## 2022-06-13 DIAGNOSIS — Z34.91 ENCOUNTER FOR SUPERVISION OF NORMAL PREGNANCY, UNSPECIFIED, FIRST TRIMESTER: ICD-10-CM

## 2022-06-13 PROCEDURE — 36415 COLL VENOUS BLD VENIPUNCTURE: CPT

## 2022-06-13 PROCEDURE — 0501F PRENATAL FLOW SHEET: CPT

## 2022-06-13 PROCEDURE — 81003 URINALYSIS AUTO W/O SCOPE: CPT | Mod: QW

## 2022-06-14 LAB
25(OH)D3 SERPL-MCNC: 37.6 NG/ML
ABO + RH PNL BLD: NORMAL
B19V IGG SER QL IA: 3.32 INDEX
B19V IGG+IGM SER-IMP: NORMAL
B19V IGG+IGM SER-IMP: POSITIVE
B19V IGM FLD-ACNC: 0.16 INDEX
B19V IGM SER-ACNC: NEGATIVE
BASOPHILS # BLD AUTO: 0.06 K/UL
BASOPHILS NFR BLD AUTO: 0.6 %
BLD GP AB SCN SERPL QL: NORMAL
EOSINOPHIL # BLD AUTO: 0.14 K/UL
EOSINOPHIL NFR BLD AUTO: 1.4 %
HBV SURFACE AG SER QL: NONREACTIVE
HCT VFR BLD CALC: 41.5 %
HCV AB SER QL: NONREACTIVE
HCV S/CO RATIO: 0.1 S/CO
HGB BLD-MCNC: 13.5 G/DL
HIV1+2 AB SPEC QL IA.RAPID: NONREACTIVE
IMM GRANULOCYTES NFR BLD AUTO: 0.3 %
LYMPHOCYTES # BLD AUTO: 2.27 K/UL
LYMPHOCYTES NFR BLD AUTO: 22.3 %
MAN DIFF?: NORMAL
MCHC RBC-ENTMCNC: 30.2 PG
MCHC RBC-ENTMCNC: 32.5 GM/DL
MCV RBC AUTO: 92.8 FL
MONOCYTES # BLD AUTO: 0.65 K/UL
MONOCYTES NFR BLD AUTO: 6.4 %
NEUTROPHILS # BLD AUTO: 7.02 K/UL
NEUTROPHILS NFR BLD AUTO: 69 %
PLATELET # BLD AUTO: 246 K/UL
RBC # BLD: 4.47 M/UL
RBC # FLD: 13.3 %
WBC # FLD AUTO: 10.17 K/UL

## 2022-06-15 PROBLEM — Z34.91 INITIAL OBSTETRIC VISIT IN FIRST TRIMESTER: Status: ACTIVE | Noted: 2022-06-13

## 2022-06-15 LAB
BACTERIA UR CULT: NORMAL
C TRACH RRNA SPEC QL NAA+PROBE: NOT DETECTED
HSV 1+2 IGG SER IA-IMP: NEGATIVE
HSV 1+2 IGG SER IA-IMP: NEGATIVE
HSV1 IGG SER QL: 0.12 INDEX
HSV2 IGG SER QL: 0.1 INDEX
MEV IGG FLD QL IA: <5 AU/ML
MEV IGG+IGM SER-IMP: NEGATIVE
N GONORRHOEA RRNA SPEC QL NAA+PROBE: NOT DETECTED
RUBV IGG FLD-ACNC: 1 INDEX
RUBV IGG SER-IMP: NORMAL
SOURCE AMPLIFICATION: NORMAL
T GONDII AB SER-IMP: NEGATIVE
T GONDII AB SER-IMP: NEGATIVE
T GONDII IGG SER QL: <3 IU/ML
T GONDII IGM SER QL: <3 AU/ML
T PALLIDUM AB SER QL IA: NEGATIVE
VZV AB TITR SER: POSITIVE
VZV IGG SER IF-ACNC: 449.4 INDEX

## 2022-06-17 LAB
HSV1 IGM SER QL: NEGATIVE
HSV2 AB FLD-ACNC: NEGATIVE

## 2022-07-07 ENCOUNTER — ASOB RESULT (OUTPATIENT)
Age: 32
End: 2022-07-07

## 2022-07-07 ENCOUNTER — APPOINTMENT (OUTPATIENT)
Dept: ANTEPARTUM | Facility: CLINIC | Age: 32
End: 2022-07-07

## 2022-07-07 PROCEDURE — 76813 OB US NUCHAL MEAS 1 GEST: CPT

## 2022-07-15 ENCOUNTER — NON-APPOINTMENT (OUTPATIENT)
Age: 32
End: 2022-07-15

## 2022-07-15 ENCOUNTER — TRANSCRIPTION ENCOUNTER (OUTPATIENT)
Age: 32
End: 2022-07-15

## 2022-07-15 LAB
ADDENDUM DOC: NORMAL
AFP PNL SERPL: NORMAL
AFP SERPL-ACNC: NORMAL
BP MEAN: NORMAL
CLINICAL BIOCHEMIST REVIEW: NORMAL
EARLY ONSET PREECLAMPSIA: NORMAL
FREE BETA HCG 1ST TRIMESTER: NORMAL
INHIBIN-A 1ST TRIMESTER: NORMAL
Lab: NORMAL
NOTES NTD: NORMAL
NT: NORMAL
PAPP-A SERPL-ACNC: NORMAL
PIGF SER-MCNC: NORMAL
TRISOMY 18/3: NORMAL
UTERINE ARTERY DOPPLER PI (UTAD-PI): NORMAL

## 2022-07-18 ENCOUNTER — APPOINTMENT (OUTPATIENT)
Dept: OBGYN | Facility: CLINIC | Age: 32
End: 2022-07-18

## 2022-07-18 VITALS
SYSTOLIC BLOOD PRESSURE: 116 MMHG | HEIGHT: 67 IN | BODY MASS INDEX: 24.01 KG/M2 | DIASTOLIC BLOOD PRESSURE: 70 MMHG | WEIGHT: 153 LBS

## 2022-07-18 PROCEDURE — 81003 URINALYSIS AUTO W/O SCOPE: CPT | Mod: QW

## 2022-07-18 PROCEDURE — 0502F SUBSEQUENT PRENATAL CARE: CPT

## 2022-08-15 ENCOUNTER — APPOINTMENT (OUTPATIENT)
Dept: OBGYN | Facility: CLINIC | Age: 32
End: 2022-08-15

## 2022-08-15 VITALS
HEIGHT: 67 IN | WEIGHT: 160 LBS | DIASTOLIC BLOOD PRESSURE: 72 MMHG | SYSTOLIC BLOOD PRESSURE: 110 MMHG | BODY MASS INDEX: 25.11 KG/M2

## 2022-08-15 LAB
BILIRUB UR QL STRIP: NORMAL
GLUCOSE UR-MCNC: NORMAL
HCG UR QL: 0.2 EU/DL
HGB UR QL STRIP.AUTO: NORMAL
KETONES UR-MCNC: NORMAL
LEUKOCYTE ESTERASE UR QL STRIP: NORMAL
NITRITE UR QL STRIP: NORMAL
PH UR STRIP: 7
PROT UR STRIP-MCNC: NORMAL
SP GR UR STRIP: 13.01

## 2022-08-15 PROCEDURE — 0502F SUBSEQUENT PRENATAL CARE: CPT

## 2022-08-15 PROCEDURE — 81003 URINALYSIS AUTO W/O SCOPE: CPT | Mod: QW

## 2022-08-15 PROCEDURE — 36415 COLL VENOUS BLD VENIPUNCTURE: CPT

## 2022-08-26 ENCOUNTER — APPOINTMENT (OUTPATIENT)
Dept: ANTEPARTUM | Facility: CLINIC | Age: 32
End: 2022-08-26

## 2022-08-26 ENCOUNTER — ASOB RESULT (OUTPATIENT)
Age: 32
End: 2022-08-26

## 2022-08-26 PROCEDURE — 76805 OB US >/= 14 WKS SNGL FETUS: CPT | Mod: 59

## 2022-08-26 PROCEDURE — 76817 TRANSVAGINAL US OBSTETRIC: CPT

## 2022-09-01 ENCOUNTER — TRANSCRIPTION ENCOUNTER (OUTPATIENT)
Age: 32
End: 2022-09-01

## 2022-09-01 LAB — AFP PNL SERPL: NORMAL

## 2022-09-08 ENCOUNTER — NON-APPOINTMENT (OUTPATIENT)
Age: 32
End: 2022-09-08

## 2022-09-12 ENCOUNTER — APPOINTMENT (OUTPATIENT)
Dept: OBGYN | Facility: CLINIC | Age: 32
End: 2022-09-12

## 2022-09-12 VITALS
BODY MASS INDEX: 26.06 KG/M2 | SYSTOLIC BLOOD PRESSURE: 116 MMHG | WEIGHT: 166 LBS | DIASTOLIC BLOOD PRESSURE: 70 MMHG | HEIGHT: 67 IN

## 2022-09-12 DIAGNOSIS — Z34.92 ENCOUNTER FOR SUPERVISION OF NORMAL PREGNANCY, UNSPECIFIED, SECOND TRIMESTER: ICD-10-CM

## 2022-09-12 PROCEDURE — 0502F SUBSEQUENT PRENATAL CARE: CPT

## 2022-10-11 ENCOUNTER — APPOINTMENT (OUTPATIENT)
Dept: OBGYN | Facility: CLINIC | Age: 32
End: 2022-10-11

## 2022-10-11 VITALS
BODY MASS INDEX: 27.62 KG/M2 | WEIGHT: 176 LBS | SYSTOLIC BLOOD PRESSURE: 110 MMHG | HEIGHT: 67 IN | DIASTOLIC BLOOD PRESSURE: 68 MMHG

## 2022-10-11 PROCEDURE — 81003 URINALYSIS AUTO W/O SCOPE: CPT | Mod: QW

## 2022-10-11 PROCEDURE — 0502F SUBSEQUENT PRENATAL CARE: CPT

## 2022-10-12 ENCOUNTER — NON-APPOINTMENT (OUTPATIENT)
Age: 32
End: 2022-10-12

## 2022-10-12 LAB
BASOPHILS # BLD AUTO: 0.04 K/UL
BASOPHILS NFR BLD AUTO: 0.4 %
BILIRUB UR QL STRIP: NORMAL
EOSINOPHIL # BLD AUTO: 0.13 K/UL
EOSINOPHIL NFR BLD AUTO: 1.4 %
GLUCOSE 1H P 50 G GLC PO SERPL-MCNC: 105 MG/DL
GLUCOSE UR-MCNC: NORMAL
HCG UR QL: 1 EU/DL
HCT VFR BLD CALC: 34.1 %
HGB BLD-MCNC: 11.2 G/DL
HGB UR QL STRIP.AUTO: NORMAL
HPV HIGH+LOW RISK DNA PNL CVX: NOT DETECTED
IMM GRANULOCYTES NFR BLD AUTO: 0.3 %
KETONES UR-MCNC: NORMAL
LEUKOCYTE ESTERASE UR QL STRIP: NORMAL
LYMPHOCYTES # BLD AUTO: 1.75 K/UL
LYMPHOCYTES NFR BLD AUTO: 18.8 %
MAN DIFF?: NORMAL
MCHC RBC-ENTMCNC: 30.5 PG
MCHC RBC-ENTMCNC: 32.8 GM/DL
MCV RBC AUTO: 92.9 FL
MONOCYTES # BLD AUTO: 0.55 K/UL
MONOCYTES NFR BLD AUTO: 5.9 %
NEUTROPHILS # BLD AUTO: 6.83 K/UL
NEUTROPHILS NFR BLD AUTO: 73.2 %
NITRITE UR QL STRIP: NORMAL
PH UR STRIP: 6.5
PLATELET # BLD AUTO: 220 K/UL
PROT UR STRIP-MCNC: NORMAL
RBC # BLD: 3.67 M/UL
RBC # FLD: 12.7 %
SP GR UR STRIP: 1.02
WBC # FLD AUTO: 9.33 K/UL

## 2022-10-26 ENCOUNTER — NON-APPOINTMENT (OUTPATIENT)
Age: 32
End: 2022-10-26

## 2022-10-26 LAB — CYTOLOGY CVX/VAG DOC THIN PREP: ABNORMAL

## 2022-10-31 ENCOUNTER — APPOINTMENT (OUTPATIENT)
Dept: OBGYN | Facility: CLINIC | Age: 32
End: 2022-10-31

## 2022-10-31 ENCOUNTER — APPOINTMENT (OUTPATIENT)
Dept: ANTEPARTUM | Facility: CLINIC | Age: 32
End: 2022-10-31

## 2022-10-31 ENCOUNTER — ASOB RESULT (OUTPATIENT)
Age: 32
End: 2022-10-31

## 2022-10-31 VITALS
WEIGHT: 177 LBS | DIASTOLIC BLOOD PRESSURE: 60 MMHG | HEIGHT: 69 IN | SYSTOLIC BLOOD PRESSURE: 110 MMHG | BODY MASS INDEX: 26.22 KG/M2

## 2022-10-31 PROCEDURE — 0502F SUBSEQUENT PRENATAL CARE: CPT

## 2022-10-31 PROCEDURE — 81003 URINALYSIS AUTO W/O SCOPE: CPT | Mod: QW

## 2022-10-31 PROCEDURE — 76816 OB US FOLLOW-UP PER FETUS: CPT

## 2022-11-02 LAB
BILIRUB UR QL STRIP: NORMAL
GLUCOSE UR-MCNC: NORMAL
HCG UR QL: 0.2 EU/DL
HGB UR QL STRIP.AUTO: NORMAL
KETONES UR-MCNC: NORMAL
LEUKOCYTE ESTERASE UR QL STRIP: NORMAL
NITRITE UR QL STRIP: NORMAL
PH UR STRIP: 6.5
PROT UR STRIP-MCNC: NORMAL
SP GR UR STRIP: 1

## 2022-11-15 ENCOUNTER — NON-APPOINTMENT (OUTPATIENT)
Age: 32
End: 2022-11-15

## 2022-11-21 ENCOUNTER — APPOINTMENT (OUTPATIENT)
Dept: OBGYN | Facility: CLINIC | Age: 32
End: 2022-11-21

## 2022-11-21 VITALS
HEIGHT: 69 IN | BODY MASS INDEX: 26.07 KG/M2 | WEIGHT: 176 LBS | SYSTOLIC BLOOD PRESSURE: 116 MMHG | DIASTOLIC BLOOD PRESSURE: 76 MMHG

## 2022-11-21 PROCEDURE — 81003 URINALYSIS AUTO W/O SCOPE: CPT | Mod: QW

## 2022-11-21 PROCEDURE — 0502F SUBSEQUENT PRENATAL CARE: CPT

## 2022-12-06 ENCOUNTER — APPOINTMENT (OUTPATIENT)
Dept: ANTEPARTUM | Facility: CLINIC | Age: 32
End: 2022-12-06

## 2022-12-06 ENCOUNTER — ASOB RESULT (OUTPATIENT)
Age: 32
End: 2022-12-06

## 2022-12-06 PROCEDURE — 76816 OB US FOLLOW-UP PER FETUS: CPT

## 2022-12-13 ENCOUNTER — NON-APPOINTMENT (OUTPATIENT)
Age: 32
End: 2022-12-13

## 2022-12-15 ENCOUNTER — APPOINTMENT (OUTPATIENT)
Dept: OBGYN | Facility: CLINIC | Age: 32
End: 2022-12-15

## 2022-12-15 VITALS
WEIGHT: 179 LBS | DIASTOLIC BLOOD PRESSURE: 60 MMHG | SYSTOLIC BLOOD PRESSURE: 116 MMHG | HEIGHT: 69 IN | BODY MASS INDEX: 26.51 KG/M2

## 2022-12-15 PROCEDURE — 0502F SUBSEQUENT PRENATAL CARE: CPT

## 2022-12-16 ENCOUNTER — NON-APPOINTMENT (OUTPATIENT)
Age: 32
End: 2022-12-16

## 2022-12-16 ENCOUNTER — TRANSCRIPTION ENCOUNTER (OUTPATIENT)
Age: 32
End: 2022-12-16

## 2022-12-16 LAB
BASOPHILS # BLD AUTO: 0.02 K/UL
BASOPHILS NFR BLD AUTO: 0.3 %
EOSINOPHIL # BLD AUTO: 0.07 K/UL
EOSINOPHIL NFR BLD AUTO: 0.9 %
HCT VFR BLD CALC: 32 %
HGB BLD-MCNC: 10.3 G/DL
HIV1+2 AB SPEC QL IA.RAPID: NONREACTIVE
IMM GRANULOCYTES NFR BLD AUTO: 0.3 %
LYMPHOCYTES # BLD AUTO: 1.54 K/UL
LYMPHOCYTES NFR BLD AUTO: 20.1 %
MAN DIFF?: NORMAL
MCHC RBC-ENTMCNC: 29.2 PG
MCHC RBC-ENTMCNC: 32.2 GM/DL
MCV RBC AUTO: 90.7 FL
MONOCYTES # BLD AUTO: 0.43 K/UL
MONOCYTES NFR BLD AUTO: 5.6 %
NEUTROPHILS # BLD AUTO: 5.6 K/UL
NEUTROPHILS NFR BLD AUTO: 72.8 %
PLATELET # BLD AUTO: 191 K/UL
RBC # BLD: 3.53 M/UL
RBC # FLD: 13 %
WBC # FLD AUTO: 7.68 K/UL

## 2022-12-20 LAB
GP B STREP DNA SPEC QL NAA+PROBE: NOT DETECTED
SOURCE GBS: NORMAL

## 2022-12-21 ENCOUNTER — NON-APPOINTMENT (OUTPATIENT)
Age: 32
End: 2022-12-21

## 2022-12-22 ENCOUNTER — APPOINTMENT (OUTPATIENT)
Dept: OBGYN | Facility: CLINIC | Age: 32
End: 2022-12-22

## 2022-12-22 VITALS
HEIGHT: 69 IN | DIASTOLIC BLOOD PRESSURE: 60 MMHG | SYSTOLIC BLOOD PRESSURE: 110 MMHG | WEIGHT: 179 LBS | BODY MASS INDEX: 26.51 KG/M2

## 2022-12-22 PROCEDURE — 0502F SUBSEQUENT PRENATAL CARE: CPT

## 2022-12-28 ENCOUNTER — APPOINTMENT (OUTPATIENT)
Dept: OBGYN | Facility: CLINIC | Age: 32
End: 2022-12-28

## 2022-12-28 VITALS
BODY MASS INDEX: 26.51 KG/M2 | TEMPERATURE: 98.4 F | HEART RATE: 100 BPM | WEIGHT: 179 LBS | SYSTOLIC BLOOD PRESSURE: 115 MMHG | DIASTOLIC BLOOD PRESSURE: 73 MMHG | RESPIRATION RATE: 12 BRPM | HEIGHT: 69 IN | OXYGEN SATURATION: 100 %

## 2022-12-28 PROCEDURE — 0502F SUBSEQUENT PRENATAL CARE: CPT

## 2022-12-28 PROCEDURE — 81003 URINALYSIS AUTO W/O SCOPE: CPT | Mod: NC,QW

## 2022-12-30 LAB
BILIRUB UR QL STRIP: NORMAL
CLARITY UR: CLEAR
GLUCOSE UR-MCNC: NORMAL
HCG UR QL: 0.2 EU/DL
HGB UR QL STRIP.AUTO: NORMAL
KETONES UR-MCNC: NORMAL
LEUKOCYTE ESTERASE UR QL STRIP: NORMAL
NITRITE UR QL STRIP: NORMAL
PH UR STRIP: 7
PROT UR STRIP-MCNC: NORMAL
SP GR UR STRIP: 1.02

## 2023-01-05 ENCOUNTER — APPOINTMENT (OUTPATIENT)
Dept: OBGYN | Facility: CLINIC | Age: 33
End: 2023-01-05
Payer: COMMERCIAL

## 2023-01-05 ENCOUNTER — INPATIENT (INPATIENT)
Facility: HOSPITAL | Age: 33
LOS: 2 days | Discharge: HOME | End: 2023-01-08
Attending: OBSTETRICS & GYNECOLOGY | Admitting: OBSTETRICS & GYNECOLOGY
Payer: COMMERCIAL

## 2023-01-05 VITALS — HEIGHT: 67 IN | WEIGHT: 179.9 LBS

## 2023-01-05 VITALS
SYSTOLIC BLOOD PRESSURE: 114 MMHG | TEMPERATURE: 98.2 F | HEIGHT: 69 IN | RESPIRATION RATE: 16 BRPM | HEART RATE: 88 BPM | WEIGHT: 179 LBS | BODY MASS INDEX: 26.51 KG/M2 | DIASTOLIC BLOOD PRESSURE: 65 MMHG

## 2023-01-05 DIAGNOSIS — O26.899 OTHER SPECIFIED PREGNANCY RELATED CONDITIONS, UNSPECIFIED TRIMESTER: ICD-10-CM

## 2023-01-05 LAB
BASOPHILS # BLD AUTO: 0.03 K/UL — SIGNIFICANT CHANGE UP (ref 0–0.2)
BASOPHILS NFR BLD AUTO: 0.3 % — SIGNIFICANT CHANGE UP (ref 0–2)
EOSINOPHIL # BLD AUTO: 0.04 K/UL — SIGNIFICANT CHANGE UP (ref 0–0.5)
EOSINOPHIL NFR BLD AUTO: 0.4 % — SIGNIFICANT CHANGE UP (ref 0–6)
HCT VFR BLD CALC: 34.9 % — SIGNIFICANT CHANGE UP (ref 34.5–45)
HGB BLD-MCNC: 12.2 G/DL — SIGNIFICANT CHANGE UP (ref 11.5–15.5)
IMM GRANULOCYTES NFR BLD AUTO: 0.3 % — SIGNIFICANT CHANGE UP (ref 0–0.9)
LYMPHOCYTES # BLD AUTO: 1.42 K/UL — SIGNIFICANT CHANGE UP (ref 1–3.3)
LYMPHOCYTES # BLD AUTO: 14.8 % — SIGNIFICANT CHANGE UP (ref 13–44)
MCHC RBC-ENTMCNC: 30.5 PG — SIGNIFICANT CHANGE UP (ref 27–34)
MCHC RBC-ENTMCNC: 35 GM/DL — SIGNIFICANT CHANGE UP (ref 32–36)
MCV RBC AUTO: 87.3 FL — SIGNIFICANT CHANGE UP (ref 80–100)
MONOCYTES # BLD AUTO: 0.57 K/UL — SIGNIFICANT CHANGE UP (ref 0–0.9)
MONOCYTES NFR BLD AUTO: 5.9 % — SIGNIFICANT CHANGE UP (ref 2–14)
NEUTROPHILS # BLD AUTO: 7.52 K/UL — HIGH (ref 1.8–7.4)
NEUTROPHILS NFR BLD AUTO: 78.3 % — HIGH (ref 43–77)
PLATELET # BLD AUTO: 231 K/UL — SIGNIFICANT CHANGE UP (ref 150–400)
RBC # BLD: 4 M/UL — SIGNIFICANT CHANGE UP (ref 3.8–5.2)
RBC # FLD: 13.8 % — SIGNIFICANT CHANGE UP (ref 10.3–14.5)
SARS-COV-2 RNA SPEC QL NAA+PROBE: SIGNIFICANT CHANGE UP
WBC # BLD: 9.61 K/UL — SIGNIFICANT CHANGE UP (ref 3.8–10.5)
WBC # FLD AUTO: 9.61 K/UL — SIGNIFICANT CHANGE UP (ref 3.8–10.5)

## 2023-01-05 PROCEDURE — 86901 BLOOD TYPING SEROLOGIC RH(D): CPT

## 2023-01-05 PROCEDURE — 59025 FETAL NON-STRESS TEST: CPT

## 2023-01-05 PROCEDURE — 90707 MMR VACCINE SC: CPT

## 2023-01-05 PROCEDURE — 99214 OFFICE O/P EST MOD 30 MIN: CPT

## 2023-01-05 PROCEDURE — 0502F SUBSEQUENT PRENATAL CARE: CPT

## 2023-01-05 PROCEDURE — 85018 HEMOGLOBIN: CPT

## 2023-01-05 PROCEDURE — U0003: CPT

## 2023-01-05 PROCEDURE — 86769 SARS-COV-2 COVID-19 ANTIBODY: CPT

## 2023-01-05 PROCEDURE — 86780 TREPONEMA PALLIDUM: CPT

## 2023-01-05 PROCEDURE — 86850 RBC ANTIBODY SCREEN: CPT

## 2023-01-05 PROCEDURE — 36415 COLL VENOUS BLD VENIPUNCTURE: CPT

## 2023-01-05 PROCEDURE — 85014 HEMATOCRIT: CPT

## 2023-01-05 PROCEDURE — 59050 FETAL MONITOR W/REPORT: CPT

## 2023-01-05 PROCEDURE — 86900 BLOOD TYPING SEROLOGIC ABO: CPT

## 2023-01-05 PROCEDURE — U0005: CPT

## 2023-01-05 PROCEDURE — 85025 COMPLETE CBC W/AUTO DIFF WBC: CPT

## 2023-01-05 RX ORDER — SODIUM CHLORIDE 9 MG/ML
1000 INJECTION, SOLUTION INTRAVENOUS
Refills: 0 | Status: DISCONTINUED | OUTPATIENT
Start: 2023-01-05 | End: 2023-01-06

## 2023-01-05 RX ORDER — INFLUENZA VIRUS VACCINE 15; 15; 15; 15 UG/.5ML; UG/.5ML; UG/.5ML; UG/.5ML
0.5 SUSPENSION INTRAMUSCULAR ONCE
Refills: 0 | Status: DISCONTINUED | OUTPATIENT
Start: 2023-01-05 | End: 2023-01-08

## 2023-01-05 RX ORDER — CITRIC ACID/SODIUM CITRATE 300-500 MG
30 SOLUTION, ORAL ORAL ONCE
Refills: 0 | Status: DISCONTINUED | OUTPATIENT
Start: 2023-01-05 | End: 2023-01-06

## 2023-01-05 RX ORDER — OXYTOCIN 10 UNIT/ML
333.33 VIAL (ML) INJECTION
Qty: 20 | Refills: 0 | Status: DISCONTINUED | OUTPATIENT
Start: 2023-01-05 | End: 2023-01-08

## 2023-01-05 RX ORDER — CHLORHEXIDINE GLUCONATE 213 G/1000ML
1 SOLUTION TOPICAL ONCE
Refills: 0 | Status: DISCONTINUED | OUTPATIENT
Start: 2023-01-05 | End: 2023-01-06

## 2023-01-05 RX ORDER — DINOPROSTONE 10 MG/241MG
10 INSERT VAGINAL ONCE
Refills: 0 | Status: COMPLETED | OUTPATIENT
Start: 2023-01-05 | End: 2023-01-05

## 2023-01-05 RX ADMIN — SODIUM CHLORIDE 125 MILLILITER(S): 9 INJECTION, SOLUTION INTRAVENOUS at 16:17

## 2023-01-05 RX ADMIN — DINOPROSTONE 10 MILLIGRAM(S): 10 INSERT VAGINAL at 16:15

## 2023-01-06 LAB
COVID-19 SPIKE DOMAIN AB INTERP: POSITIVE
COVID-19 SPIKE DOMAIN ANTIBODY RESULT: 34 U/ML — HIGH
SARS-COV-2 IGG+IGM SERPL QL IA: 34 U/ML — HIGH
SARS-COV-2 IGG+IGM SERPL QL IA: POSITIVE
T PALLIDUM AB TITR SER: NEGATIVE — SIGNIFICANT CHANGE UP

## 2023-01-06 PROCEDURE — 59400 OBSTETRICAL CARE: CPT

## 2023-01-06 RX ORDER — PRAMOXINE HYDROCHLORIDE 150 MG/15G
1 AEROSOL, FOAM RECTAL EVERY 4 HOURS
Refills: 0 | Status: DISCONTINUED | OUTPATIENT
Start: 2023-01-06 | End: 2023-01-08

## 2023-01-06 RX ORDER — HYDROCORTISONE 1 %
1 OINTMENT (GRAM) TOPICAL EVERY 6 HOURS
Refills: 0 | Status: DISCONTINUED | OUTPATIENT
Start: 2023-01-06 | End: 2023-01-08

## 2023-01-06 RX ORDER — OXYTOCIN 10 UNIT/ML
VIAL (ML) INJECTION
Qty: 30 | Refills: 0 | Status: DISCONTINUED | OUTPATIENT
Start: 2023-01-06 | End: 2023-01-06

## 2023-01-06 RX ORDER — OXYCODONE HYDROCHLORIDE 5 MG/1
5 TABLET ORAL ONCE
Refills: 0 | Status: DISCONTINUED | OUTPATIENT
Start: 2023-01-06 | End: 2023-01-08

## 2023-01-06 RX ORDER — SIMETHICONE 80 MG/1
80 TABLET, CHEWABLE ORAL EVERY 4 HOURS
Refills: 0 | Status: DISCONTINUED | OUTPATIENT
Start: 2023-01-06 | End: 2023-01-08

## 2023-01-06 RX ORDER — BUTORPHANOL TARTRATE 2 MG/ML
2 INJECTION, SOLUTION INTRAMUSCULAR; INTRAVENOUS ONCE
Refills: 0 | Status: DISCONTINUED | OUTPATIENT
Start: 2023-01-06 | End: 2023-01-06

## 2023-01-06 RX ORDER — DIPHENHYDRAMINE HCL 50 MG
25 CAPSULE ORAL EVERY 6 HOURS
Refills: 0 | Status: DISCONTINUED | OUTPATIENT
Start: 2023-01-06 | End: 2023-01-08

## 2023-01-06 RX ORDER — KETOROLAC TROMETHAMINE 30 MG/ML
30 SYRINGE (ML) INJECTION ONCE
Refills: 0 | Status: DISCONTINUED | OUTPATIENT
Start: 2023-01-06 | End: 2023-01-06

## 2023-01-06 RX ORDER — SODIUM CHLORIDE 9 MG/ML
3 INJECTION INTRAMUSCULAR; INTRAVENOUS; SUBCUTANEOUS EVERY 8 HOURS
Refills: 0 | Status: DISCONTINUED | OUTPATIENT
Start: 2023-01-06 | End: 2023-01-07

## 2023-01-06 RX ORDER — ACETAMINOPHEN 500 MG
975 TABLET ORAL
Refills: 0 | Status: DISCONTINUED | OUTPATIENT
Start: 2023-01-06 | End: 2023-01-08

## 2023-01-06 RX ORDER — AER TRAVELER 0.5 G/1
1 SOLUTION RECTAL; TOPICAL EVERY 4 HOURS
Refills: 0 | Status: DISCONTINUED | OUTPATIENT
Start: 2023-01-06 | End: 2023-01-08

## 2023-01-06 RX ORDER — BENZOCAINE 10 %
1 GEL (GRAM) MUCOUS MEMBRANE EVERY 6 HOURS
Refills: 0 | Status: DISCONTINUED | OUTPATIENT
Start: 2023-01-06 | End: 2023-01-08

## 2023-01-06 RX ORDER — OXYTOCIN 10 UNIT/ML
41.67 VIAL (ML) INJECTION
Qty: 20 | Refills: 0 | Status: DISCONTINUED | OUTPATIENT
Start: 2023-01-06 | End: 2023-01-08

## 2023-01-06 RX ORDER — LANOLIN
1 OINTMENT (GRAM) TOPICAL EVERY 6 HOURS
Refills: 0 | Status: DISCONTINUED | OUTPATIENT
Start: 2023-01-06 | End: 2023-01-08

## 2023-01-06 RX ORDER — IBUPROFEN 200 MG
600 TABLET ORAL EVERY 6 HOURS
Refills: 0 | Status: COMPLETED | OUTPATIENT
Start: 2023-01-06 | End: 2023-12-05

## 2023-01-06 RX ORDER — BUTORPHANOL TARTRATE 2 MG/ML
2 INJECTION, SOLUTION INTRAMUSCULAR; INTRAVENOUS ONCE
Refills: 0 | Status: DISCONTINUED | OUTPATIENT
Start: 2023-01-06 | End: 2023-01-08

## 2023-01-06 RX ORDER — MAGNESIUM HYDROXIDE 400 MG/1
30 TABLET, CHEWABLE ORAL
Refills: 0 | Status: DISCONTINUED | OUTPATIENT
Start: 2023-01-06 | End: 2023-01-08

## 2023-01-06 RX ORDER — IBUPROFEN 200 MG
600 TABLET ORAL EVERY 6 HOURS
Refills: 0 | Status: DISCONTINUED | OUTPATIENT
Start: 2023-01-06 | End: 2023-01-08

## 2023-01-06 RX ORDER — DIBUCAINE 1 %
1 OINTMENT (GRAM) RECTAL EVERY 6 HOURS
Refills: 0 | Status: DISCONTINUED | OUTPATIENT
Start: 2023-01-06 | End: 2023-01-08

## 2023-01-06 RX ORDER — TETANUS TOXOID, REDUCED DIPHTHERIA TOXOID AND ACELLULAR PERTUSSIS VACCINE, ADSORBED 5; 2.5; 8; 8; 2.5 [IU]/.5ML; [IU]/.5ML; UG/.5ML; UG/.5ML; UG/.5ML
0.5 SUSPENSION INTRAMUSCULAR ONCE
Refills: 0 | Status: DISCONTINUED | OUTPATIENT
Start: 2023-01-06 | End: 2023-01-08

## 2023-01-06 RX ORDER — OXYCODONE HYDROCHLORIDE 5 MG/1
5 TABLET ORAL
Refills: 0 | Status: DISCONTINUED | OUTPATIENT
Start: 2023-01-06 | End: 2023-01-08

## 2023-01-06 RX ADMIN — BUTORPHANOL TARTRATE 2 MILLIGRAM(S): 2 INJECTION, SOLUTION INTRAMUSCULAR; INTRAVENOUS at 02:30

## 2023-01-06 RX ADMIN — Medication 30 MILLIGRAM(S): at 07:32

## 2023-01-06 RX ADMIN — Medication 600 MILLIGRAM(S): at 13:30

## 2023-01-06 RX ADMIN — BUTORPHANOL TARTRATE 2 MILLIGRAM(S): 2 INJECTION, SOLUTION INTRAMUSCULAR; INTRAVENOUS at 07:15

## 2023-01-06 RX ADMIN — SODIUM CHLORIDE 3 MILLILITER(S): 9 INJECTION INTRAMUSCULAR; INTRAVENOUS; SUBCUTANEOUS at 13:06

## 2023-01-06 RX ADMIN — Medication 975 MILLIGRAM(S): at 10:40

## 2023-01-06 RX ADMIN — Medication 975 MILLIGRAM(S): at 21:07

## 2023-01-06 RX ADMIN — Medication 600 MILLIGRAM(S): at 12:58

## 2023-01-06 RX ADMIN — Medication 975 MILLIGRAM(S): at 10:07

## 2023-01-06 RX ADMIN — Medication 600 MILLIGRAM(S): at 18:02

## 2023-01-06 RX ADMIN — Medication 2 MILLIUNIT(S)/MIN: at 02:30

## 2023-01-06 RX ADMIN — Medication 975 MILLIGRAM(S): at 22:00

## 2023-01-06 RX ADMIN — Medication 30 MILLIGRAM(S): at 09:19

## 2023-01-06 RX ADMIN — Medication 1 TABLET(S): at 10:06

## 2023-01-07 ENCOUNTER — TRANSCRIPTION ENCOUNTER (OUTPATIENT)
Age: 33
End: 2023-01-07

## 2023-01-07 LAB
HCT VFR BLD CALC: 32.7 % — LOW (ref 34.5–45)
HGB BLD-MCNC: 10.7 G/DL — LOW (ref 11.5–15.5)

## 2023-01-07 RX ORDER — ACETAMINOPHEN 500 MG
3 TABLET ORAL
Qty: 0 | Refills: 0 | DISCHARGE
Start: 2023-01-07

## 2023-01-07 RX ORDER — IBUPROFEN 200 MG
1 TABLET ORAL
Qty: 0 | Refills: 0 | DISCHARGE
Start: 2023-01-07

## 2023-01-07 RX ADMIN — Medication 975 MILLIGRAM(S): at 21:38

## 2023-01-07 RX ADMIN — Medication 975 MILLIGRAM(S): at 15:24

## 2023-01-07 RX ADMIN — Medication 600 MILLIGRAM(S): at 17:56

## 2023-01-07 RX ADMIN — Medication 1 TABLET(S): at 11:30

## 2023-01-07 RX ADMIN — Medication 600 MILLIGRAM(S): at 01:00

## 2023-01-07 RX ADMIN — Medication 975 MILLIGRAM(S): at 21:08

## 2023-01-07 RX ADMIN — Medication 600 MILLIGRAM(S): at 00:39

## 2023-01-07 RX ADMIN — Medication 600 MILLIGRAM(S): at 11:30

## 2023-01-07 RX ADMIN — Medication 975 MILLIGRAM(S): at 08:14

## 2023-01-07 NOTE — DISCHARGE NOTE OB - PATIENT PORTAL LINK FT
You can access the FollowMyHealth Patient Portal offered by [facility] by registering at the following website: [email or web address]. By joining [employer]’s FollowMyHealth portal, you will also be able to view your health information using other applications (apps) compatible with our system.

## 2023-01-07 NOTE — PROGRESS NOTE ADULT - ATTENDING COMMENTS
ILDEFONSO RODRIGO is a 32y  now PPD#1 s/p normal spontaneous vaginal delivery @39w1d GA, uncomplicated.  continue postpartum care    Dr Rodriguez

## 2023-01-07 NOTE — DISCHARGE NOTE OB - NS MD DC FALL RISK RISK
For information on Fall & Injury Prevention, visit: [email or web address] OR  [email or web address] OR  https://www.cdc.gov/steadi/patient.html

## 2023-01-07 NOTE — DISCHARGE NOTE OB - CARE PROVIDER_API CALL
Tyler Stephen OB-GYN  [address] [place] [address]  Phone: [phone]  Fax: [phone]  Follow Up Time:

## 2023-01-07 NOTE — PROGRESS NOTE ADULT - ASSESSMENT
A/P:  ILDEFONSO WALLACE is a 32y  now PPD#1 s/p normal spontaneous vaginal delivery @39w1d GA, uncomplicated.  -Vital signs stable  -Hgb: 12.2 -> 10.7  -Tolerating PO  -Advance care as tolerated   -Continue routine postpartum care and education  -Dispo: Patient to be discharged when meeting all postpartum milestones and pending attending approval.

## 2023-01-07 NOTE — PROGRESS NOTE ADULT - SUBJECTIVE AND OBJECTIVE BOX
ILDEFONSO WALLACE is a 32y  now PPD#1 s/p normal spontaneous vaginal delivery @39w1d GA, uncomplicated.    S:    No acute events overnight.   The patient has no complaints.  Pain controlled with current treatment regimen.   She is ambulating without difficulty and tolerating PO.   She endorses appropriate lochia, which is decreasing.   She is providing formula to baby.  She denies fevers, chills, nausea and vomiting.   She denies lightheadedness, dizziness, palpitations, chest pain and SOB.     O:    T(C): 36.6 (23 @ 07:35), Max: 36.7 (23 @ 00:19)  HR: 73 (23 @ 07:35) (73 - 76)  BP: 111/73 (23 @ 07:35) (108/73 - 112/66)  RR: 17 (23 @ 07:35) (17 - 18)  SpO2: 98% (23 @ 07:35) (98% - 99%)    Gen: NAD, AOx3  Resp: breathing comfortably on RA  Abdomen:  Soft, non-tender  Uterus:  Fundus firm below umbilicus  VE:  Lochia as expected                          10.7   x     )-----------( x        ( 2023 07:39 )             32.7

## 2023-01-08 VITALS
RESPIRATION RATE: 18 BRPM | OXYGEN SATURATION: 100 % | DIASTOLIC BLOOD PRESSURE: 79 MMHG | TEMPERATURE: 98 F | HEART RATE: 57 BPM | SYSTOLIC BLOOD PRESSURE: 126 MMHG

## 2023-01-08 RX ADMIN — Medication 600 MILLIGRAM(S): at 00:30

## 2023-01-08 RX ADMIN — Medication 0.5 MILLILITER(S): at 08:14

## 2023-01-08 RX ADMIN — Medication 600 MILLIGRAM(S): at 06:15

## 2023-01-08 RX ADMIN — Medication 600 MILLIGRAM(S): at 06:45

## 2023-01-08 RX ADMIN — Medication 600 MILLIGRAM(S): at 01:00

## 2023-01-08 RX ADMIN — Medication 975 MILLIGRAM(S): at 08:05

## 2023-01-08 NOTE — PROGRESS NOTE ADULT - SUBJECTIVE AND OBJECTIVE BOX
31 yo  s/p  at 39.1 weeks   IOL for oligohydramnios.   PPD#2    Denies fever/chills, nausea/emesis, dyspnea, dizziness, chest pain, or severe abdominal pain.  Denies heavy vaginal bleeding or pelvic pain    Gen:   In NAD    VSS Afebrile    abdomen   non tender  Extremities   no calf tenderness

## 2023-01-08 NOTE — PROGRESS NOTE ADULT - ASSESSMENT
PPD#2 s/p  doing well    d/c home  follow up postpartum care   call for appointment  pelvic rest x 6 weeks  Motrin prn  ER warnings given

## 2023-01-10 ENCOUNTER — NON-APPOINTMENT (OUTPATIENT)
Age: 33
End: 2023-01-10

## 2023-01-12 ENCOUNTER — APPOINTMENT (OUTPATIENT)
Dept: OBGYN | Facility: CLINIC | Age: 33
End: 2023-01-12

## 2023-01-12 DIAGNOSIS — O26.893 OTHER SPECIFIED PREGNANCY RELATED CONDITIONS, THIRD TRIMESTER: ICD-10-CM

## 2023-01-12 DIAGNOSIS — Z3A.39 39 WEEKS GESTATION OF PREGNANCY: ICD-10-CM

## 2023-01-12 DIAGNOSIS — Z20.822 CONTACT WITH AND (SUSPECTED) EXPOSURE TO COVID-19: ICD-10-CM

## 2023-01-12 DIAGNOSIS — Z23 ENCOUNTER FOR IMMUNIZATION: ICD-10-CM

## 2023-01-12 DIAGNOSIS — Z28.20 IMMUNIZATION NOT CARRIED OUT BECAUSE OF PATIENT DECISION FOR UNSPECIFIED REASON: ICD-10-CM

## 2023-01-12 DIAGNOSIS — O41.03X0 OLIGOHYDRAMNIOS, THIRD TRIMESTER, NOT APPLICABLE OR UNSPECIFIED: ICD-10-CM

## 2023-02-13 ENCOUNTER — APPOINTMENT (OUTPATIENT)
Dept: OBGYN | Facility: CLINIC | Age: 33
End: 2023-02-13
Payer: COMMERCIAL

## 2023-02-13 VITALS
BODY MASS INDEX: 23.85 KG/M2 | DIASTOLIC BLOOD PRESSURE: 74 MMHG | HEIGHT: 69 IN | WEIGHT: 161 LBS | SYSTOLIC BLOOD PRESSURE: 118 MMHG

## 2023-02-13 PROCEDURE — 0503F POSTPARTUM CARE VISIT: CPT

## 2023-02-13 NOTE — HISTORY OF PRESENT ILLNESS
[Postpartum Follow Up] : postpartum follow up [Delivery Date: ___] : on [unfilled] [] : delivered by vaginal delivery [Female] : Delivery History: baby girl [Breastfeeding] : currently nursing [Back to Normal] : is back to normal in size [None] : no vaginal bleeding [Normal] : the vagina was normal [Doing Well] : is doing well [No Sign of Infection] : is showing no signs of infection [FreeTextEntry8] : Doing well. No vb. She decided not to breastfeed. Denies s/s pp depression. [de-identified] : E 0/30 [de-identified] : 1. We discussed reversible methods of contraception at length including OCPs, ring, patch, Depo Provera, and LARCs.  will use condoms. 2. RTO 3mo for annual exam

## 2023-05-18 ENCOUNTER — APPOINTMENT (OUTPATIENT)
Dept: OBGYN | Facility: CLINIC | Age: 33
End: 2023-05-18
Payer: COMMERCIAL

## 2023-05-18 VITALS
HEIGHT: 69 IN | SYSTOLIC BLOOD PRESSURE: 116 MMHG | BODY MASS INDEX: 23.11 KG/M2 | DIASTOLIC BLOOD PRESSURE: 70 MMHG | WEIGHT: 156 LBS

## 2023-05-18 DIAGNOSIS — Z01.419 ENCOUNTER FOR GYNECOLOGICAL EXAMINATION (GENERAL) (ROUTINE) W/OUT ABNORMAL FINDINGS: ICD-10-CM

## 2023-05-18 PROCEDURE — 99395 PREV VISIT EST AGE 18-39: CPT

## 2023-05-19 LAB — HPV HIGH+LOW RISK DNA PNL CVX: NOT DETECTED

## 2023-05-23 LAB — CYTOLOGY CVX/VAG DOC THIN PREP: ABNORMAL

## 2023-05-24 ENCOUNTER — NON-APPOINTMENT (OUTPATIENT)
Age: 33
End: 2023-05-24

## 2023-07-18 NOTE — DISCHARGE NOTE OB - USE THE FOOD PYRAMID (LOCATED IN DISCHARGE MATERIALS PROVIDED) AS A GUIDE TO BALANCE AND MODERATION
Intake at [facility] called back, accepting MD; Dr. Guo. States to set up transport with next available. ED MD aware.    Statement Selected

## 2023-10-31 ENCOUNTER — TRANSCRIPTION ENCOUNTER (OUTPATIENT)
Age: 33
End: 2023-10-31

## 2023-10-31 DIAGNOSIS — Z32.00 ENCOUNTER FOR PREGNANCY TEST, RESULT UNKNOWN: ICD-10-CM

## 2023-11-02 LAB — HCG SERPL-MCNC: ABNORMAL MIU/ML

## 2023-11-07 ENCOUNTER — APPOINTMENT (OUTPATIENT)
Dept: OBGYN | Facility: CLINIC | Age: 33
End: 2023-11-07
Payer: COMMERCIAL

## 2023-11-07 VITALS
BODY MASS INDEX: 22.66 KG/M2 | DIASTOLIC BLOOD PRESSURE: 70 MMHG | WEIGHT: 153 LBS | HEIGHT: 69 IN | SYSTOLIC BLOOD PRESSURE: 112 MMHG

## 2023-11-07 DIAGNOSIS — N91.1 SECONDARY AMENORRHEA: ICD-10-CM

## 2023-11-07 DIAGNOSIS — O21.9 VOMITING OF PREGNANCY, UNSPECIFIED: ICD-10-CM

## 2023-11-07 PROCEDURE — 81025 URINE PREGNANCY TEST: CPT

## 2023-11-07 PROCEDURE — 99214 OFFICE O/P EST MOD 30 MIN: CPT | Mod: 25

## 2023-11-07 PROCEDURE — 76830 TRANSVAGINAL US NON-OB: CPT

## 2023-11-07 RX ORDER — DOXYLAMINE SUCCINATE AND PYRIDOXINE HYDROCHLORIDE 10; 10 MG/1; MG/1
10-10 TABLET, DELAYED RELEASE ORAL
Qty: 60 | Refills: 0 | Status: ACTIVE | COMMUNITY
Start: 2023-11-07 | End: 1900-01-01

## 2023-11-14 ENCOUNTER — TRANSCRIPTION ENCOUNTER (OUTPATIENT)
Age: 33
End: 2023-11-14

## 2023-11-28 ENCOUNTER — APPOINTMENT (OUTPATIENT)
Dept: OBGYN | Facility: CLINIC | Age: 33
End: 2023-11-28
Payer: COMMERCIAL

## 2023-11-28 VITALS — DIASTOLIC BLOOD PRESSURE: 68 MMHG | BODY MASS INDEX: 23.48 KG/M2 | SYSTOLIC BLOOD PRESSURE: 116 MMHG | WEIGHT: 159 LBS

## 2023-11-28 DIAGNOSIS — Z34.91 ENCOUNTER FOR SUPERVISION OF NORMAL PREGNANCY, UNSPECIFIED, FIRST TRIMESTER: ICD-10-CM

## 2023-11-28 PROCEDURE — 81003 URINALYSIS AUTO W/O SCOPE: CPT | Mod: NC,QW

## 2023-11-28 PROCEDURE — 36415 COLL VENOUS BLD VENIPUNCTURE: CPT

## 2023-11-28 PROCEDURE — 0501F PRENATAL FLOW SHEET: CPT

## 2023-11-29 LAB
APPEARANCE: CLEAR
BACTERIA: ABNORMAL /HPF
BASOPHILS # BLD AUTO: 0.04 K/UL
BASOPHILS NFR BLD AUTO: 0.4 %
BILIRUBIN URINE: NEGATIVE
BLD GP AB SCN SERPL QL: NORMAL
BLOOD URINE: NEGATIVE
C TRACH RRNA SPEC QL NAA+PROBE: NOT DETECTED
CAST: 0 /LPF
COLOR: YELLOW
EOSINOPHIL # BLD AUTO: 0.16 K/UL
EOSINOPHIL NFR BLD AUTO: 1.7 %
EPITHELIAL CELLS: 26 /HPF
GLUCOSE QUALITATIVE U: NEGATIVE MG/DL
HBV SURFACE AG SER QL: NONREACTIVE
HCT VFR BLD CALC: 38.2 %
HCV AB SER QL: NONREACTIVE
HCV S/CO RATIO: 0.06 S/CO
HGB BLD-MCNC: 12.8 G/DL
HIV1+2 AB SPEC QL IA.RAPID: NONREACTIVE
HSV 1+2 IGG SER IA-IMP: NEGATIVE
HSV 1+2 IGG SER IA-IMP: NEGATIVE
HSV1 IGG SER QL: 0.03 INDEX
HSV2 IGG SER QL: 0.15 INDEX
IMM GRANULOCYTES NFR BLD AUTO: 0.2 %
KETONES URINE: NEGATIVE MG/DL
LEUKOCYTE ESTERASE URINE: NEGATIVE
LYMPHOCYTES # BLD AUTO: 2.16 K/UL
LYMPHOCYTES NFR BLD AUTO: 23.4 %
MAN DIFF?: NORMAL
MCHC RBC-ENTMCNC: 29.8 PG
MCHC RBC-ENTMCNC: 33.5 GM/DL
MCV RBC AUTO: 89 FL
MICROSCOPIC-UA: NORMAL
MONOCYTES # BLD AUTO: 0.64 K/UL
MONOCYTES NFR BLD AUTO: 6.9 %
N GONORRHOEA RRNA SPEC QL NAA+PROBE: NOT DETECTED
NEUTROPHILS # BLD AUTO: 6.21 K/UL
NEUTROPHILS NFR BLD AUTO: 67.4 %
NITRITE URINE: NEGATIVE
PH URINE: 6
PLATELET # BLD AUTO: 242 K/UL
PROTEIN URINE: NEGATIVE MG/DL
RBC # BLD: 4.29 M/UL
RBC # FLD: 13.4 %
RED BLOOD CELLS URINE: 0 /HPF
RUBV IGG FLD-ACNC: 14.8 INDEX
RUBV IGG SER-IMP: POSITIVE
SOURCE AMPLIFICATION: NORMAL
SPECIFIC GRAVITY URINE: 1.03
T GONDII AB SER-IMP: NEGATIVE
T GONDII AB SER-IMP: NEGATIVE
T GONDII IGG SER QL: <3 IU/ML
T GONDII IGM SER QL: <3 AU/ML
T PALLIDUM AB SER QL IA: NEGATIVE
UROBILINOGEN URINE: 0.2 MG/DL
WBC # FLD AUTO: 9.23 K/UL
WHITE BLOOD CELLS URINE: 6 /HPF

## 2023-11-30 LAB — BACTERIA UR CULT: NORMAL

## 2023-12-03 LAB
B19V IGG SER QL IA: POSITIVE
B19V IGG+IGM SER-IMP: NORMAL
B19V IGM FLD-ACNC: NEGATIVE

## 2023-12-11 ENCOUNTER — APPOINTMENT (OUTPATIENT)
Dept: ANTEPARTUM | Facility: CLINIC | Age: 33
End: 2023-12-11
Payer: COMMERCIAL

## 2023-12-11 ENCOUNTER — ASOB RESULT (OUTPATIENT)
Age: 33
End: 2023-12-11

## 2023-12-11 PROCEDURE — 76813 OB US NUCHAL MEAS 1 GEST: CPT

## 2023-12-15 ENCOUNTER — TRANSCRIPTION ENCOUNTER (OUTPATIENT)
Age: 33
End: 2023-12-15

## 2023-12-15 LAB
ADDITIONAL US: NORMAL
COMMENTS: AFP: NORMAL
CRL SCAN TWIN B: NORMAL
CRL SCAN: NORMAL
CROWN RUMP LENGTH TWIN B: NORMAL
CROWN RUMP LENGTH: 57.8 MM
DOWN SYNDROME AGE RISK: NORMAL
DOWN SYNDROME INTERPRETATION: NORMAL
DOWN SYNDROME SCREENING RISK: NORMAL
GEST. AGE ON COLLECTION DATE: 12.1 WEEKS
HCG MOM: 0.76
HCG VALUE: 77.3 IU/ML
MATERNAL AGE AT EDD: 33.6 YR
NOTE: AFP: NORMAL
NT MOM TWIN B: NORMAL
NT TWIN B: NORMAL
NUCHAL TRANSLUCENCY (NT): 1.6 MM
NUCHAL TRANSLUCENCY MOM: 1.06
NUMBER OF FETUSES: 1
PAPP-A MOM: 1.6
PAPP-A VALUE: 1371.4 NG/ML
RACE: NORMAL
RESULTS AFP: NORMAL
SONOGRAPHER ID#: NORMAL
SUBMIT PART 2 SAMPLE USING: NORMAL
TEST RESULTS: AFP: NORMAL
TRISOMY 18 AGE RISK: NORMAL
TRISOMY 18 INTERPRETATION: NORMAL
TRISOMY 18 SCREENING RISK: NORMAL
WEIGHT AFP: 155 LBS

## 2024-01-02 ENCOUNTER — APPOINTMENT (OUTPATIENT)
Dept: OBGYN | Facility: CLINIC | Age: 34
End: 2024-01-02
Payer: COMMERCIAL

## 2024-01-02 VITALS
RESPIRATION RATE: 16 BRPM | DIASTOLIC BLOOD PRESSURE: 66 MMHG | HEIGHT: 69 IN | BODY MASS INDEX: 23.11 KG/M2 | WEIGHT: 156 LBS | SYSTOLIC BLOOD PRESSURE: 108 MMHG

## 2024-01-02 PROCEDURE — 0502F SUBSEQUENT PRENATAL CARE: CPT

## 2024-01-02 PROCEDURE — 36415 COLL VENOUS BLD VENIPUNCTURE: CPT

## 2024-01-12 LAB
ADDITIONAL US: NORMAL
AFP MOM: 1.01
AFP VALUE: 28.6 NG/ML
COLLECTED ON 2: NORMAL
COLLECTED ON: NORMAL
CRL SCAN TWIN B: NORMAL
CRL SCAN: NORMAL
CROWN RUMP LENGTH TWIN B: NORMAL
CROWN RUMP LENGTH: 57.8 MM
DIA MOM: 0.63
DIA VALUE: 98.9 PG/ML
DOWN SYNDROME AGE RISK: NORMAL
DOWN SYNDROME INTERPRETATION: NORMAL
DOWN SYNDROME SCREENING RISK: NORMAL
FIRST TRIMESTER SAMPLE: NORMAL
GEST. AGE ON COLLECTION DATE: 12.1 WEEKS
GESTATIONAL AGE: 15.3 WEEKS
HCG MOM: 0.92
HCG VALUE: 42.1 IU/ML
INSULIN DEP DIABETES: NO
MATERNAL AGE AT EDD: 33.6 YR
NT MOM TWIN B: NORMAL
NT TWIN B: NORMAL
NUCHAL TRANSLUCENCY (NT): 1.6 MM
NUCHAL TRANSLUCENCY MOM: 1.06
NUMBER OF FETUSES: 1
OPEN SPINA BIFIDA: NORMAL
OSB INTERPRETATION: NORMAL
PAPP-A MOM: 1.6
PAPP-A VALUE: 1371.4 NG/ML
RACE: NORMAL
SECOND TRIMESTER SAMPLE: NORMAL
SEQUENTIAL 2 COMMENTS: NORMAL
SEQUENTIAL 2 NOTE: NORMAL
SEQUENTIAL 2 RESULTS: NORMAL
SEQUENTIAL 2 TEST RESULTS: NORMAL
SONOGRAPHER ID#: NORMAL
TRISOMY 18 AGE RISK: NORMAL
TRISOMY 18 INTERPRETATION: NORMAL
TRISOMY 18 SCREENING RISK: NORMAL
UE3 MOM: 1.06
UE3 VALUE: 0.85 NG/ML
WEIGHT AFP: 155 LBS
WEIGHT: 156 LBS

## 2024-01-30 ENCOUNTER — APPOINTMENT (OUTPATIENT)
Dept: OBGYN | Facility: CLINIC | Age: 34
End: 2024-01-30
Payer: COMMERCIAL

## 2024-01-30 VITALS
BODY MASS INDEX: 23.7 KG/M2 | WEIGHT: 160 LBS | SYSTOLIC BLOOD PRESSURE: 108 MMHG | DIASTOLIC BLOOD PRESSURE: 70 MMHG | HEIGHT: 69 IN | RESPIRATION RATE: 16 BRPM

## 2024-01-30 PROCEDURE — 0502F SUBSEQUENT PRENATAL CARE: CPT

## 2024-01-30 PROCEDURE — 81003 URINALYSIS AUTO W/O SCOPE: CPT | Mod: NC,QW

## 2024-02-05 ENCOUNTER — APPOINTMENT (OUTPATIENT)
Dept: ANTEPARTUM | Facility: CLINIC | Age: 34
End: 2024-02-05
Payer: COMMERCIAL

## 2024-02-05 ENCOUNTER — ASOB RESULT (OUTPATIENT)
Age: 34
End: 2024-02-05

## 2024-02-05 PROCEDURE — 76811 OB US DETAILED SNGL FETUS: CPT

## 2024-02-22 ENCOUNTER — NON-APPOINTMENT (OUTPATIENT)
Age: 34
End: 2024-02-22

## 2024-02-26 ENCOUNTER — APPOINTMENT (OUTPATIENT)
Dept: OBGYN | Facility: CLINIC | Age: 34
End: 2024-02-26
Payer: COMMERCIAL

## 2024-02-26 DIAGNOSIS — Z34.92 ENCOUNTER FOR SUPERVISION OF NORMAL PREGNANCY, UNSPECIFIED, SECOND TRIMESTER: ICD-10-CM

## 2024-02-26 PROCEDURE — 0502F SUBSEQUENT PRENATAL CARE: CPT

## 2024-02-26 PROCEDURE — 81003 URINALYSIS AUTO W/O SCOPE: CPT | Mod: NC,QW

## 2024-03-25 ENCOUNTER — APPOINTMENT (OUTPATIENT)
Dept: OBGYN | Facility: CLINIC | Age: 34
End: 2024-03-25
Payer: COMMERCIAL

## 2024-03-25 VITALS
DIASTOLIC BLOOD PRESSURE: 70 MMHG | SYSTOLIC BLOOD PRESSURE: 112 MMHG | HEIGHT: 60 IN | BODY MASS INDEX: 34.36 KG/M2 | WEIGHT: 175 LBS

## 2024-03-25 PROCEDURE — 36415 COLL VENOUS BLD VENIPUNCTURE: CPT

## 2024-03-25 PROCEDURE — 0502F SUBSEQUENT PRENATAL CARE: CPT

## 2024-03-26 LAB
BASOPHILS # BLD AUTO: 0.07 K/UL
BASOPHILS NFR BLD AUTO: 0.6 %
EOSINOPHIL # BLD AUTO: 0.17 K/UL
EOSINOPHIL NFR BLD AUTO: 1.5 %
GLUCOSE 1H P 50 G GLC PO SERPL-MCNC: 77 MG/DL
HCT VFR BLD CALC: 32.5 %
HGB BLD-MCNC: 10.6 G/DL
IMM GRANULOCYTES NFR BLD AUTO: 0.3 %
LYMPHOCYTES # BLD AUTO: 2.43 K/UL
LYMPHOCYTES NFR BLD AUTO: 21 %
MAN DIFF?: NORMAL
MCHC RBC-ENTMCNC: 28.7 PG
MCHC RBC-ENTMCNC: 32.6 GM/DL
MCV RBC AUTO: 88.1 FL
MONOCYTES # BLD AUTO: 0.68 K/UL
MONOCYTES NFR BLD AUTO: 5.9 %
NEUTROPHILS # BLD AUTO: 8.21 K/UL
NEUTROPHILS NFR BLD AUTO: 70.7 %
PLATELET # BLD AUTO: 251 K/UL
RBC # BLD: 3.69 M/UL
RBC # FLD: 13.2 %
WBC # FLD AUTO: 11.59 K/UL

## 2024-04-12 ENCOUNTER — NON-APPOINTMENT (OUTPATIENT)
Age: 34
End: 2024-04-12

## 2024-04-16 ENCOUNTER — APPOINTMENT (OUTPATIENT)
Dept: OBGYN | Facility: CLINIC | Age: 34
End: 2024-04-16
Payer: COMMERCIAL

## 2024-04-16 VITALS
DIASTOLIC BLOOD PRESSURE: 74 MMHG | WEIGHT: 177 LBS | BODY MASS INDEX: 34.75 KG/M2 | SYSTOLIC BLOOD PRESSURE: 114 MMHG | HEIGHT: 60 IN

## 2024-04-16 PROCEDURE — 81003 URINALYSIS AUTO W/O SCOPE: CPT | Mod: NC,QW

## 2024-04-16 PROCEDURE — 0502F SUBSEQUENT PRENATAL CARE: CPT

## 2024-04-18 LAB — T PALLIDUM AB SER QL IA: NEGATIVE

## 2024-05-06 ENCOUNTER — NON-APPOINTMENT (OUTPATIENT)
Age: 34
End: 2024-05-06

## 2024-05-08 ENCOUNTER — NON-APPOINTMENT (OUTPATIENT)
Age: 34
End: 2024-05-08

## 2024-05-08 ENCOUNTER — APPOINTMENT (OUTPATIENT)
Dept: OBGYN | Facility: CLINIC | Age: 34
End: 2024-05-08
Payer: COMMERCIAL

## 2024-05-08 VITALS
BODY MASS INDEX: 34.57 KG/M2 | HEART RATE: 98 BPM | DIASTOLIC BLOOD PRESSURE: 77 MMHG | WEIGHT: 177 LBS | SYSTOLIC BLOOD PRESSURE: 132 MMHG

## 2024-05-08 LAB
BILIRUB UR QL STRIP: NEGATIVE
CLARITY UR: CLEAR
COLLECTION METHOD: NORMAL
GLUCOSE UR-MCNC: NEGATIVE
HCG UR QL: 0.2 EU/DL
HGB UR QL STRIP.AUTO: NEGATIVE
KETONES UR-MCNC: NORMAL
LEUKOCYTE ESTERASE UR QL STRIP: NORMAL
NITRITE UR QL STRIP: NEGATIVE
PH UR STRIP: 7
PROT UR STRIP-MCNC: NORMAL
SP GR UR STRIP: 1.02

## 2024-05-08 PROCEDURE — 0502F SUBSEQUENT PRENATAL CARE: CPT

## 2024-05-08 PROCEDURE — 81003 URINALYSIS AUTO W/O SCOPE: CPT | Mod: NC,QW

## 2024-05-10 LAB — BACTERIA UR CULT: NORMAL

## 2024-05-19 ENCOUNTER — NON-APPOINTMENT (OUTPATIENT)
Age: 34
End: 2024-05-19

## 2024-05-28 ENCOUNTER — NON-APPOINTMENT (OUTPATIENT)
Age: 34
End: 2024-05-28

## 2024-05-28 ENCOUNTER — OUTPATIENT (OUTPATIENT)
Dept: INPATIENT UNIT | Facility: HOSPITAL | Age: 34
LOS: 1 days | Discharge: HOME | End: 2024-05-28
Payer: COMMERCIAL

## 2024-05-28 ENCOUNTER — ASOB RESULT (OUTPATIENT)
Age: 34
End: 2024-05-28

## 2024-05-28 ENCOUNTER — APPOINTMENT (OUTPATIENT)
Dept: ANTEPARTUM | Facility: CLINIC | Age: 34
End: 2024-05-28
Payer: COMMERCIAL

## 2024-05-28 VITALS
TEMPERATURE: 98 F | SYSTOLIC BLOOD PRESSURE: 118 MMHG | OXYGEN SATURATION: 100 % | DIASTOLIC BLOOD PRESSURE: 80 MMHG | RESPIRATION RATE: 19 BRPM | HEART RATE: 64 BPM

## 2024-05-28 DIAGNOSIS — O26.899 OTHER SPECIFIED PREGNANCY RELATED CONDITIONS, UNSPECIFIED TRIMESTER: ICD-10-CM

## 2024-05-28 PROCEDURE — 59025 FETAL NON-STRESS TEST: CPT | Mod: 26

## 2024-05-28 PROCEDURE — 59025 FETAL NON-STRESS TEST: CPT

## 2024-05-28 PROCEDURE — 76816 OB US FOLLOW-UP PER FETUS: CPT

## 2024-05-28 PROCEDURE — 99221 1ST HOSP IP/OBS SF/LOW 40: CPT | Mod: 25

## 2024-05-28 PROCEDURE — 99214 OFFICE O/P EST MOD 30 MIN: CPT

## 2024-05-28 NOTE — OB RN TRIAGE NOTE - FALL HARM RISK - UNIVERSAL INTERVENTIONS
Bed in lowest position, wheels locked, appropriate side rails in place/Call bell, personal items and telephone in reach/Instruct patient to call for assistance before getting out of bed or chair/Non-slip footwear when patient is out of bed/[place] to call system/Physically safe environment - no spills, clutter or unnecessary equipment/Purposeful Proactive Rounding/Room/bathroom lighting operational, light cord in reach

## 2024-05-28 NOTE — OB PROVIDER TRIAGE NOTE - HISTORY OF PRESENT ILLNESS
33y  at 36w3d GA by LMP sent to L&D for fetal tachycardia noted during routine prenatal appointment today. No h/o fetal tachycardia during this pregnancy. Patient denies vaginal bleeding, contractions and leakage of fluid. She endorses good fetal movement. Denies fevers, chills, nausea and vomiting. No other complaints at this time.     Prenatal course uncomplicated.      POB: ,  x2 (, )  PGYN: -fibroids, -ovarian cysts, denies STD hx, denies abnormal PAPs   PMH: Denies  PSH: Denies  SH: Denies EtOH, tobacco and illicit drug use during this pregnancy; feels safe at home   Meds: PNVs  Allergies: NKDA

## 2024-05-28 NOTE — OB PROVIDER TRIAGE NOTE - NSHPPHYSICALEXAM_GEN_ALL_CORE
T(C): 36.4 (05-28-24 @ 16:13), Max: 36.4 (05-28-24 @ 16:13)  HR: 64 (05-28-24 @ 16:13) (64 - 64)  BP: 118/80 (05-28-24 @ 16:13) (118/80 - 118/80)  RR: 19 (05-28-24 @ 16:13) (19 - 19)  SpO2: 100% (05-28-24 @ 16:13) (100% - 100%)  Gen: NAD, well-appearing   Abd: Soft, gravid  Ext: non-tender, non-edematous    FHT: baseline 135, moderate variability, + accels, - decels  [place]: irregular contractions

## 2024-05-28 NOTE — OB PROVIDER TRIAGE NOTE - ATTENDING COMMENTS
Agree with assessment and plan as above.     Patient is stable for discharge home. Given return precautions of: contractions, leakage of fluid, vaginal bleeding and decreased fetal movement. Patient expressed understanding and will follow up with her provider outpatient.

## 2024-05-28 NOTE — OB PROVIDER TRIAGE NOTE - NSOBPROVIDERNOTE_OBGYN_ALL_OB_FT
A/P: 33y  @ 36w3d evaluated for fetal tachycardia   - VSS  - NST reactive, no tachycardia noted  - Irregular contractions on toco, pt not feeling contractions at this time     Dispo: Patient is stable for discharge to home with outpatient follow up, next appointment Thursday. Return precautions given. Patient verbalized understanding and agreement with plan.     Discussed w/ Dr. Guallpa

## 2024-05-30 ENCOUNTER — NON-APPOINTMENT (OUTPATIENT)
Age: 34
End: 2024-05-30

## 2024-05-30 ENCOUNTER — APPOINTMENT (OUTPATIENT)
Dept: OBGYN | Facility: CLINIC | Age: 34
End: 2024-05-30
Payer: COMMERCIAL

## 2024-05-30 VITALS
WEIGHT: 181 LBS | SYSTOLIC BLOOD PRESSURE: 114 MMHG | HEIGHT: 60 IN | DIASTOLIC BLOOD PRESSURE: 70 MMHG | BODY MASS INDEX: 35.53 KG/M2

## 2024-05-30 DIAGNOSIS — Z3A.36 36 WEEKS GESTATION OF PREGNANCY: ICD-10-CM

## 2024-05-30 DIAGNOSIS — O36.8330 MATERNAL CARE FOR ABNORMALITIES OF THE FETAL HEART RATE OR RHYTHM, THIRD TRIMESTER, NOT APPLICABLE OR UNSPECIFIED: ICD-10-CM

## 2024-05-30 PROBLEM — Z78.9 OTHER SPECIFIED HEALTH STATUS: Chronic | Status: ACTIVE | Noted: 2024-05-28

## 2024-05-30 PROCEDURE — 0502F SUBSEQUENT PRENATAL CARE: CPT

## 2024-05-30 PROCEDURE — 81003 URINALYSIS AUTO W/O SCOPE: CPT | Mod: NC,QW

## 2024-05-30 PROCEDURE — 36415 COLL VENOUS BLD VENIPUNCTURE: CPT

## 2024-05-30 PROCEDURE — 59025 FETAL NON-STRESS TEST: CPT

## 2024-05-31 LAB
BASOPHILS # BLD AUTO: 0.04 K/UL
BASOPHILS NFR BLD AUTO: 0.5 %
EOSINOPHIL # BLD AUTO: 0.07 K/UL
EOSINOPHIL NFR BLD AUTO: 0.9 %
HCT VFR BLD CALC: 31.7 %
HGB BLD-MCNC: 10.1 G/DL
HIV1+2 AB SPEC QL IA.RAPID: NONREACTIVE
IMM GRANULOCYTES NFR BLD AUTO: 0.2 %
LYMPHOCYTES # BLD AUTO: 1.68 K/UL
LYMPHOCYTES NFR BLD AUTO: 20.5 %
MAN DIFF?: NORMAL
MCHC RBC-ENTMCNC: 27.4 PG
MCHC RBC-ENTMCNC: 31.9 GM/DL
MCV RBC AUTO: 86.1 FL
MONOCYTES # BLD AUTO: 0.45 K/UL
MONOCYTES NFR BLD AUTO: 5.5 %
NEUTROPHILS # BLD AUTO: 5.92 K/UL
NEUTROPHILS NFR BLD AUTO: 72.4 %
PLATELET # BLD AUTO: 201 K/UL
RBC # BLD: 3.68 M/UL
RBC # FLD: 13.7 %
WBC # FLD AUTO: 8.18 K/UL

## 2024-06-01 LAB
GP B STREP DNA SPEC QL NAA+PROBE: NOT DETECTED
SOURCE GBS: NORMAL

## 2024-06-06 ENCOUNTER — APPOINTMENT (OUTPATIENT)
Dept: OBGYN | Facility: CLINIC | Age: 34
End: 2024-06-06
Payer: COMMERCIAL

## 2024-06-06 ENCOUNTER — NON-APPOINTMENT (OUTPATIENT)
Age: 34
End: 2024-06-06

## 2024-06-06 VITALS
DIASTOLIC BLOOD PRESSURE: 76 MMHG | SYSTOLIC BLOOD PRESSURE: 114 MMHG | BODY MASS INDEX: 29.84 KG/M2 | WEIGHT: 152 LBS | HEIGHT: 60 IN

## 2024-06-06 LAB
BILIRUB UR QL STRIP: NORMAL
GLUCOSE UR-MCNC: NORMAL
HCG UR QL: 0.2 EU/DL
HGB UR QL STRIP.AUTO: NORMAL
KETONES UR-MCNC: NORMAL
LEUKOCYTE ESTERASE UR QL STRIP: NORMAL
NITRITE UR QL STRIP: NORMAL
PH UR STRIP: 7.5
PROT UR STRIP-MCNC: NORMAL
SP GR UR STRIP: 1.02

## 2024-06-06 PROCEDURE — 0502F SUBSEQUENT PRENATAL CARE: CPT

## 2024-06-06 PROCEDURE — 81003 URINALYSIS AUTO W/O SCOPE: CPT | Mod: NC,QW

## 2024-06-07 ENCOUNTER — ASOB RESULT (OUTPATIENT)
Age: 34
End: 2024-06-07

## 2024-06-07 ENCOUNTER — APPOINTMENT (OUTPATIENT)
Dept: ANTEPARTUM | Facility: CLINIC | Age: 34
End: 2024-06-07
Payer: COMMERCIAL

## 2024-06-07 PROCEDURE — 76818 FETAL BIOPHYS PROFILE W/NST: CPT

## 2024-06-12 ENCOUNTER — NON-APPOINTMENT (OUTPATIENT)
Age: 34
End: 2024-06-12

## 2024-06-13 ENCOUNTER — APPOINTMENT (OUTPATIENT)
Dept: OBGYN | Facility: CLINIC | Age: 34
End: 2024-06-13
Payer: COMMERCIAL

## 2024-06-13 VITALS
DIASTOLIC BLOOD PRESSURE: 72 MMHG | WEIGHT: 183 LBS | HEIGHT: 66 IN | BODY MASS INDEX: 29.41 KG/M2 | SYSTOLIC BLOOD PRESSURE: 116 MMHG

## 2024-06-13 PROCEDURE — 0502F SUBSEQUENT PRENATAL CARE: CPT

## 2024-06-13 PROCEDURE — 81003 URINALYSIS AUTO W/O SCOPE: CPT | Mod: NC,QW

## 2024-06-14 LAB
BASOPHILS # BLD AUTO: 0.04 K/UL
BASOPHILS NFR BLD AUTO: 0.4 %
EOSINOPHIL # BLD AUTO: 0.1 K/UL
EOSINOPHIL NFR BLD AUTO: 1 %
HCT VFR BLD CALC: 31 %
HGB BLD-MCNC: 9.7 G/DL
IMM GRANULOCYTES NFR BLD AUTO: 0.4 %
LYMPHOCYTES # BLD AUTO: 1.94 K/UL
LYMPHOCYTES NFR BLD AUTO: 20.1 %
MAN DIFF?: NORMAL
MCHC RBC-ENTMCNC: 26.4 PG
MCHC RBC-ENTMCNC: 31.3 GM/DL
MCV RBC AUTO: 84.5 FL
MONOCYTES # BLD AUTO: 0.66 K/UL
MONOCYTES NFR BLD AUTO: 6.8 %
NEUTROPHILS # BLD AUTO: 6.87 K/UL
NEUTROPHILS NFR BLD AUTO: 71.3 %
PLATELET # BLD AUTO: 192 K/UL
RBC # BLD: 3.67 M/UL
RBC # FLD: 14.5 %
WBC # FLD AUTO: 9.65 K/UL

## 2024-06-18 ENCOUNTER — NON-APPOINTMENT (OUTPATIENT)
Age: 34
End: 2024-06-18

## 2024-06-20 ENCOUNTER — APPOINTMENT (OUTPATIENT)
Dept: OBGYN | Facility: CLINIC | Age: 34
End: 2024-06-20
Payer: COMMERCIAL

## 2024-06-20 VITALS
HEIGHT: 66 IN | WEIGHT: 182 LBS | BODY MASS INDEX: 29.25 KG/M2 | DIASTOLIC BLOOD PRESSURE: 72 MMHG | SYSTOLIC BLOOD PRESSURE: 114 MMHG

## 2024-06-20 DIAGNOSIS — Z34.93 ENCOUNTER FOR SUPERVISION OF NORMAL PREGNANCY, UNSPECIFIED, THIRD TRIMESTER: ICD-10-CM

## 2024-06-20 LAB
BILIRUB UR QL STRIP: NORMAL
GLUCOSE UR-MCNC: NORMAL
HCG UR QL: 1 EU/DL
HGB UR QL STRIP.AUTO: NORMAL
KETONES UR-MCNC: NORMAL
LEUKOCYTE ESTERASE UR QL STRIP: NORMAL
NITRITE UR QL STRIP: NORMAL
PH UR STRIP: 6
PROT UR STRIP-MCNC: NORMAL
SP GR UR STRIP: 1.02

## 2024-06-20 PROCEDURE — 81003 URINALYSIS AUTO W/O SCOPE: CPT | Mod: NC,QW

## 2024-06-20 PROCEDURE — 0502F SUBSEQUENT PRENATAL CARE: CPT

## 2024-06-21 ENCOUNTER — INPATIENT (INPATIENT)
Facility: HOSPITAL | Age: 34
LOS: 1 days | Discharge: HOME | DRG: 833 | End: 2024-06-23
Attending: OBSTETRICS & GYNECOLOGY | Admitting: OBSTETRICS & GYNECOLOGY
Payer: COMMERCIAL

## 2024-06-21 VITALS
HEART RATE: 105 BPM | HEIGHT: 69 IN | SYSTOLIC BLOOD PRESSURE: 119 MMHG | WEIGHT: 182.1 LBS | DIASTOLIC BLOOD PRESSURE: 75 MMHG | TEMPERATURE: 98 F | OXYGEN SATURATION: 100 % | RESPIRATION RATE: 14 BRPM

## 2024-06-21 DIAGNOSIS — O26.899 OTHER SPECIFIED PREGNANCY RELATED CONDITIONS, UNSPECIFIED TRIMESTER: ICD-10-CM

## 2024-06-21 LAB
BASOPHILS # BLD AUTO: 0.04 K/UL — SIGNIFICANT CHANGE UP (ref 0–0.2)
BASOPHILS NFR BLD AUTO: 0.5 % — SIGNIFICANT CHANGE UP (ref 0–2)
EOSINOPHIL # BLD AUTO: 0.07 K/UL — SIGNIFICANT CHANGE UP (ref 0–0.5)
EOSINOPHIL NFR BLD AUTO: 0.9 % — SIGNIFICANT CHANGE UP (ref 0–6)
HCT VFR BLD CALC: 31.8 % — LOW (ref 34.5–45)
HGB BLD-MCNC: 10.4 G/DL — LOW (ref 11.5–15.5)
IMM GRANULOCYTES NFR BLD AUTO: 0.4 % — SIGNIFICANT CHANGE UP (ref 0–0.9)
LYMPHOCYTES # BLD AUTO: 1.86 K/UL — SIGNIFICANT CHANGE UP (ref 1–3.3)
LYMPHOCYTES # BLD AUTO: 23.1 % — SIGNIFICANT CHANGE UP (ref 13–44)
MCHC RBC-ENTMCNC: 26.5 PG — LOW (ref 27–34)
MCHC RBC-ENTMCNC: 32.7 GM/DL — SIGNIFICANT CHANGE UP (ref 32–36)
MCV RBC AUTO: 81.1 FL — SIGNIFICANT CHANGE UP (ref 80–100)
MONOCYTES # BLD AUTO: 0.34 K/UL — SIGNIFICANT CHANGE UP (ref 0–0.9)
MONOCYTES NFR BLD AUTO: 4.2 % — SIGNIFICANT CHANGE UP (ref 2–14)
NEUTROPHILS # BLD AUTO: 5.71 K/UL — SIGNIFICANT CHANGE UP (ref 1.8–7.4)
NEUTROPHILS NFR BLD AUTO: 70.9 % — SIGNIFICANT CHANGE UP (ref 43–77)
PLATELET # BLD AUTO: 182 K/UL — SIGNIFICANT CHANGE UP (ref 150–400)
RBC # BLD: 3.92 M/UL — SIGNIFICANT CHANGE UP (ref 3.8–5.2)
RBC # FLD: 14.6 % — HIGH (ref 10.3–14.5)
T PALLIDUM AB TITR SER: NEGATIVE — SIGNIFICANT CHANGE UP
WBC # BLD: 8.05 K/UL — SIGNIFICANT CHANGE UP (ref 3.8–10.5)
WBC # FLD AUTO: 8.05 K/UL — SIGNIFICANT CHANGE UP (ref 3.8–10.5)

## 2024-06-21 PROCEDURE — 86901 BLOOD TYPING SEROLOGIC RH(D): CPT

## 2024-06-21 PROCEDURE — 86780 TREPONEMA PALLIDUM: CPT

## 2024-06-21 PROCEDURE — 94760 N-INVAS EAR/PLS OXIMETRY 1: CPT

## 2024-06-21 PROCEDURE — 59050 FETAL MONITOR W/REPORT: CPT

## 2024-06-21 PROCEDURE — 86900 BLOOD TYPING SEROLOGIC ABO: CPT

## 2024-06-21 PROCEDURE — C1889: CPT

## 2024-06-21 PROCEDURE — 59510 CESAREAN DELIVERY: CPT

## 2024-06-21 PROCEDURE — 86850 RBC ANTIBODY SCREEN: CPT

## 2024-06-21 PROCEDURE — 36415 COLL VENOUS BLD VENIPUNCTURE: CPT

## 2024-06-21 PROCEDURE — 85025 COMPLETE CBC W/AUTO DIFF WBC: CPT

## 2024-06-21 RX ORDER — KETOROLAC TROMETHAMINE 30 MG/ML
30 INJECTION, SOLUTION INTRAMUSCULAR; INTRAVENOUS EVERY 6 HOURS
Refills: 0 | Status: DISCONTINUED | OUTPATIENT
Start: 2024-06-21 | End: 2024-06-22

## 2024-06-21 RX ORDER — OXYTOCIN-SODIUM CHLORIDE 0.9% IV SOLN 30 UNIT/500ML 30-0.9/5 UT/ML-%
333.33 SOLUTION INTRAVENOUS
Qty: 20 | Refills: 0 | Status: DISCONTINUED | OUTPATIENT
Start: 2024-06-21 | End: 2024-06-23

## 2024-06-21 RX ORDER — SODIUM CHLORIDE 9 G/1000ML
1000 INJECTION, SOLUTION INTRAVENOUS
Refills: 0 | Status: DISCONTINUED | OUTPATIENT
Start: 2024-06-21 | End: 2024-06-23

## 2024-06-21 RX ORDER — OXYTOCIN-SODIUM CHLORIDE 0.9% IV SOLN 30 UNIT/500ML 30-0.9/5 UT/ML-%
2 SOLUTION INTRAVENOUS
Qty: 30 | Refills: 0 | Status: DISCONTINUED | OUTPATIENT
Start: 2024-06-21 | End: 2024-06-23

## 2024-06-21 RX ORDER — ENOXAPARIN SODIUM 100 MG/ML
40 INJECTION SUBCUTANEOUS EVERY 24 HOURS
Refills: 0 | Status: DISCONTINUED | OUTPATIENT
Start: 2024-06-22 | End: 2024-06-23

## 2024-06-21 RX ORDER — DIPHENHYDRAMINE HCL 12.5MG/5ML
25 ELIXIR ORAL EVERY 6 HOURS
Refills: 0 | Status: DISCONTINUED | OUTPATIENT
Start: 2024-06-21 | End: 2024-06-23

## 2024-06-21 RX ORDER — OXYCODONE HYDROCHLORIDE 30 MG/1
5 TABLET ORAL
Refills: 0 | Status: DISCONTINUED | OUTPATIENT
Start: 2024-06-21 | End: 2024-06-23

## 2024-06-21 RX ORDER — ACETAMINOPHEN 500 MG/5ML
1000 LIQUID (ML) ORAL ONCE
Refills: 0 | Status: COMPLETED | OUTPATIENT
Start: 2024-06-21 | End: 2024-06-21

## 2024-06-21 RX ORDER — SODIUM CHLORIDE 9 G/1000ML
1000 INJECTION, SOLUTION INTRAVENOUS
Refills: 0 | Status: DISCONTINUED | OUTPATIENT
Start: 2024-06-21 | End: 2024-06-22

## 2024-06-21 RX ORDER — MAGNESIUM HYDROXIDE 400 MG/5ML
30 SUSPENSION ORAL
Refills: 0 | Status: DISCONTINUED | OUTPATIENT
Start: 2024-06-21 | End: 2024-06-23

## 2024-06-21 RX ORDER — AZITHROMYCIN 250 MG
500 CAPSULE ORAL ONCE
Refills: 0 | Status: COMPLETED | OUTPATIENT
Start: 2024-06-21 | End: 2024-06-21

## 2024-06-21 RX ORDER — NALOXONE HYDROCHLORIDE 0.4 MG/ML
0.1 INJECTION, SOLUTION INTRAMUSCULAR; INTRAVENOUS; SUBCUTANEOUS
Refills: 0 | Status: DISCONTINUED | OUTPATIENT
Start: 2024-06-21 | End: 2024-06-23

## 2024-06-21 RX ORDER — SIMETHICONE 80 MG
80 TABLET,CHEWABLE ORAL EVERY 4 HOURS
Refills: 0 | Status: DISCONTINUED | OUTPATIENT
Start: 2024-06-21 | End: 2024-06-23

## 2024-06-21 RX ORDER — OXYTOCIN-SODIUM CHLORIDE 0.9% IV SOLN 30 UNIT/500ML 30-0.9/5 UT/ML-%
333.33 SOLUTION INTRAVENOUS
Qty: 20 | Refills: 0 | Status: COMPLETED | OUTPATIENT
Start: 2024-06-21 | End: 2024-06-21

## 2024-06-21 RX ORDER — ACETAMINOPHEN 500 MG/5ML
975 LIQUID (ML) ORAL
Refills: 0 | Status: DISCONTINUED | OUTPATIENT
Start: 2024-06-21 | End: 2024-06-23

## 2024-06-21 RX ORDER — IBUPROFEN 200 MG
600 TABLET ORAL EVERY 6 HOURS
Refills: 0 | Status: COMPLETED | OUTPATIENT
Start: 2024-06-21 | End: 2025-05-20

## 2024-06-21 RX ORDER — CITRIC ACID/SODIUM CITRATE 300-500 MG
30 SOLUTION, ORAL ORAL ONCE
Refills: 0 | Status: DISCONTINUED | OUTPATIENT
Start: 2024-06-21 | End: 2024-06-23

## 2024-06-21 RX ORDER — MODIFIED LANOLIN 100 %
1 CREAM (GRAM) TOPICAL EVERY 6 HOURS
Refills: 0 | Status: DISCONTINUED | OUTPATIENT
Start: 2024-06-21 | End: 2024-06-23

## 2024-06-21 RX ORDER — OXYCODONE HYDROCHLORIDE 30 MG/1
5 TABLET ORAL ONCE
Refills: 0 | Status: DISCONTINUED | OUTPATIENT
Start: 2024-06-21 | End: 2024-06-23

## 2024-06-21 RX ORDER — ONDANSETRON HCL/PF 4 MG/2 ML
4 VIAL (ML) INJECTION EVERY 6 HOURS
Refills: 0 | Status: DISCONTINUED | OUTPATIENT
Start: 2024-06-21 | End: 2024-06-23

## 2024-06-21 RX ORDER — HYDROMORPHONE/SOD CHLOR,ISO/PF 2 MG/10 ML
0.5 SYRINGE (ML) INJECTION
Refills: 0 | Status: DISCONTINUED | OUTPATIENT
Start: 2024-06-21 | End: 2024-06-23

## 2024-06-21 RX ORDER — CITRIC ACID/SODIUM CITRATE 300-500 MG
15 SOLUTION, ORAL ORAL EVERY 6 HOURS
Refills: 0 | Status: DISCONTINUED | OUTPATIENT
Start: 2024-06-21 | End: 2024-06-22

## 2024-06-21 RX ADMIN — OXYTOCIN-SODIUM CHLORIDE 0.9% IV SOLN 30 UNIT/500ML 1000 MILLIUNIT(S)/MIN: 30-0.9/5 SOLUTION at 21:16

## 2024-06-21 RX ADMIN — OXYTOCIN-SODIUM CHLORIDE 0.9% IV SOLN 30 UNIT/500ML 1000 MILLIUNIT(S)/MIN: 30-0.9/5 SOLUTION at 22:15

## 2024-06-21 RX ADMIN — KETOROLAC TROMETHAMINE 30 MILLIGRAM(S): 30 INJECTION, SOLUTION INTRAMUSCULAR; INTRAVENOUS at 21:30

## 2024-06-21 RX ADMIN — Medication 255 MILLIGRAM(S): at 20:43

## 2024-06-21 RX ADMIN — Medication 15 MILLILITER(S): at 20:43

## 2024-06-21 RX ADMIN — SODIUM CHLORIDE 125 MILLILITER(S): 9 INJECTION, SOLUTION INTRAVENOUS at 09:24

## 2024-06-21 RX ADMIN — Medication 1 APPLICATION(S): at 09:31

## 2024-06-21 RX ADMIN — OXYTOCIN-SODIUM CHLORIDE 0.9% IV SOLN 30 UNIT/500ML 2 MILLIUNIT(S)/MIN: 30-0.9/5 SOLUTION at 09:35

## 2024-06-21 RX ADMIN — Medication 400 MILLIGRAM(S): at 22:52

## 2024-06-22 LAB
BASOPHILS # BLD AUTO: 0.02 K/UL — SIGNIFICANT CHANGE UP (ref 0–0.2)
BASOPHILS NFR BLD AUTO: 0.2 % — SIGNIFICANT CHANGE UP (ref 0–2)
EOSINOPHIL # BLD AUTO: 0.04 K/UL — SIGNIFICANT CHANGE UP (ref 0–0.5)
EOSINOPHIL NFR BLD AUTO: 0.4 % — SIGNIFICANT CHANGE UP (ref 0–6)
HCT VFR BLD CALC: 25.9 % — LOW (ref 34.5–45)
HGB BLD-MCNC: 8.2 G/DL — LOW (ref 11.5–15.5)
IMM GRANULOCYTES NFR BLD AUTO: 0.3 % — SIGNIFICANT CHANGE UP (ref 0–0.9)
LYMPHOCYTES # BLD AUTO: 1.31 K/UL — SIGNIFICANT CHANGE UP (ref 1–3.3)
LYMPHOCYTES # BLD AUTO: 14.4 % — SIGNIFICANT CHANGE UP (ref 13–44)
MCHC RBC-ENTMCNC: 25.8 PG — LOW (ref 27–34)
MCHC RBC-ENTMCNC: 31.7 GM/DL — LOW (ref 32–36)
MCV RBC AUTO: 81.4 FL — SIGNIFICANT CHANGE UP (ref 80–100)
MONOCYTES # BLD AUTO: 0.54 K/UL — SIGNIFICANT CHANGE UP (ref 0–0.9)
MONOCYTES NFR BLD AUTO: 5.9 % — SIGNIFICANT CHANGE UP (ref 2–14)
NEUTROPHILS # BLD AUTO: 7.15 K/UL — SIGNIFICANT CHANGE UP (ref 1.8–7.4)
NEUTROPHILS NFR BLD AUTO: 78.8 % — HIGH (ref 43–77)
PLATELET # BLD AUTO: 150 K/UL — SIGNIFICANT CHANGE UP (ref 150–400)
RBC # BLD: 3.18 M/UL — LOW (ref 3.8–5.2)
RBC # FLD: 14.7 % — HIGH (ref 10.3–14.5)
WBC # BLD: 9.09 K/UL — SIGNIFICANT CHANGE UP (ref 3.8–10.5)
WBC # FLD AUTO: 9.09 K/UL — SIGNIFICANT CHANGE UP (ref 3.8–10.5)

## 2024-06-22 RX ORDER — IBUPROFEN 200 MG
600 TABLET ORAL EVERY 6 HOURS
Refills: 0 | Status: DISCONTINUED | OUTPATIENT
Start: 2024-06-22 | End: 2024-06-23

## 2024-06-22 RX ADMIN — KETOROLAC TROMETHAMINE 30 MILLIGRAM(S): 30 INJECTION, SOLUTION INTRAMUSCULAR; INTRAVENOUS at 03:32

## 2024-06-22 RX ADMIN — Medication 600 MILLIGRAM(S): at 22:40

## 2024-06-22 RX ADMIN — Medication 975 MILLIGRAM(S): at 06:33

## 2024-06-22 RX ADMIN — KETOROLAC TROMETHAMINE 30 MILLIGRAM(S): 30 INJECTION, SOLUTION INTRAMUSCULAR; INTRAVENOUS at 08:52

## 2024-06-22 RX ADMIN — Medication 975 MILLIGRAM(S): at 11:54

## 2024-06-22 RX ADMIN — KETOROLAC TROMETHAMINE 30 MILLIGRAM(S): 30 INJECTION, SOLUTION INTRAMUSCULAR; INTRAVENOUS at 15:10

## 2024-06-22 RX ADMIN — ENOXAPARIN SODIUM 40 MILLIGRAM(S): 100 INJECTION SUBCUTANEOUS at 08:52

## 2024-06-22 RX ADMIN — Medication 600 MILLIGRAM(S): at 21:41

## 2024-06-23 VITALS
RESPIRATION RATE: 17 BRPM | TEMPERATURE: 98 F | DIASTOLIC BLOOD PRESSURE: 76 MMHG | SYSTOLIC BLOOD PRESSURE: 126 MMHG | HEART RATE: 75 BPM | OXYGEN SATURATION: 100 %

## 2024-06-23 RX ORDER — ACETAMINOPHEN 500 MG/5ML
3 LIQUID (ML) ORAL
Qty: 0 | Refills: 0 | DISCHARGE
Start: 2024-06-23

## 2024-06-23 RX ORDER — IBUPROFEN 200 MG
1 TABLET ORAL
Qty: 0 | Refills: 0 | DISCHARGE
Start: 2024-06-23

## 2024-06-23 RX ADMIN — Medication 975 MILLIGRAM(S): at 00:18

## 2024-06-23 RX ADMIN — Medication 600 MILLIGRAM(S): at 09:32

## 2024-06-23 RX ADMIN — Medication 975 MILLIGRAM(S): at 07:07

## 2024-06-23 RX ADMIN — Medication 975 MILLIGRAM(S): at 06:28

## 2024-06-23 RX ADMIN — Medication 600 MILLIGRAM(S): at 03:45

## 2024-06-23 RX ADMIN — Medication 975 MILLIGRAM(S): at 01:06

## 2024-06-23 RX ADMIN — Medication 600 MILLIGRAM(S): at 02:50

## 2024-06-27 DIAGNOSIS — D25.2 SUBSEROSAL LEIOMYOMA OF UTERUS: ICD-10-CM

## 2024-06-27 DIAGNOSIS — Z3A.39 39 WEEKS GESTATION OF PREGNANCY: ICD-10-CM

## 2024-06-27 DIAGNOSIS — O34.13 MATERNAL CARE FOR BENIGN TUMOR OF CORPUS UTERI, THIRD TRIMESTER: ICD-10-CM

## 2024-06-27 DIAGNOSIS — Z34.83 ENCOUNTER FOR SUPERVISION OF OTHER NORMAL PREGNANCY, THIRD TRIMESTER: ICD-10-CM

## 2024-07-08 ENCOUNTER — APPOINTMENT (OUTPATIENT)
Dept: OBGYN | Facility: CLINIC | Age: 34
End: 2024-07-08

## 2024-07-08 VITALS
SYSTOLIC BLOOD PRESSURE: 110 MMHG | HEIGHT: 66 IN | DIASTOLIC BLOOD PRESSURE: 70 MMHG | WEIGHT: 165 LBS | BODY MASS INDEX: 26.52 KG/M2

## 2024-07-08 DIAGNOSIS — Z48.89 ENCOUNTER FOR OTHER SPECIFIED SURGICAL AFTERCARE: ICD-10-CM

## 2024-07-08 PROCEDURE — 0503F POSTPARTUM CARE VISIT: CPT

## 2024-08-05 ENCOUNTER — APPOINTMENT (OUTPATIENT)
Dept: OBGYN | Facility: CLINIC | Age: 34
End: 2024-08-05

## 2024-08-05 PROCEDURE — 0503F POSTPARTUM CARE VISIT: CPT

## 2024-08-05 NOTE — HISTORY OF PRESENT ILLNESS
[Healed] : healed [Doing Well] : is doing well [No Sign of Infection] : is showing no signs of infection [de-identified] : Doing well. Tolerating diet and ambulation without difficulties. No postop pain. She is not breastfeeding. Denies s/s pp depression. [de-identified] : 1. We discussed reversible methods of contraception at length including OCPs, ring, patch, Depo Provera, and LARCs. Desires to schedule IUD insertion. 2. RTO IUD insertion and in 3mo annual exam

## 2024-09-10 ENCOUNTER — APPOINTMENT (OUTPATIENT)
Dept: OBGYN | Facility: CLINIC | Age: 34
End: 2024-09-10
Payer: COMMERCIAL

## 2024-09-10 DIAGNOSIS — Z30.430 ENCOUNTER FOR INSERTION OF INTRAUTERINE CONTRACEPTIVE DEVICE: ICD-10-CM

## 2024-09-10 LAB
HCG UR QL: NEGATIVE
QUALITY CONTROL: YES

## 2024-09-10 PROCEDURE — 58300 INSERT INTRAUTERINE DEVICE: CPT

## 2024-09-10 PROCEDURE — 81025 URINE PREGNANCY TEST: CPT

## 2024-09-10 PROCEDURE — 76830 TRANSVAGINAL US NON-OB: CPT

## 2024-09-10 NOTE — PROCEDURE
[IUD Placement] : intrauterine device (IUD) placement [Time out performed] : Pre-procedure time out performed.  Patient's name, date of birth and procedure confirmed. [Consent Obtained] : Consent obtained [Risks] : risks [Benefits] : benefits [Alternatives] : alternatives [Patient] : patient [Infection] : infection [Bleeding] : bleeding [Pain] : pain [Expulsion] : expulsion [Failure] : failure [Uterine Perforation] : uterine perforation [Neg Pregnancy Test] : negative pregnancy test [Betadine] : Betadine [Easy Passage] : Easy passage [Post Placement Transvag. US] : post placement transvaginal ultrasound [Mirena IUD] : Mirena IUD [Tolerated Well] : Patient tolerated the procedure well [No Complications] : No complications [de-identified] : Ananth clamp [de-identified] : [id number] [de-identified] : 8/2026 [de-identified] : 9/2032

## 2024-09-23 ENCOUNTER — NON-APPOINTMENT (OUTPATIENT)
Age: 34
End: 2024-09-23

## 2024-09-26 NOTE — PATIENT PROFILE OB - WEIGHT: TOTAL WEIGHT IN KG
15 Quality 226: Preventive Care And Screening: Tobacco Use: Screening And Cessation Intervention: Patient screened for tobacco use and is an ex/non-smoker Quality 431: Preventive Care And Screening: Unhealthy Alcohol Use - Screening: Patient not identified as an unhealthy alcohol user when screened for unhealthy alcohol use using a systematic screening method Quality 130: Documentation Of Current Medications In The Medical Record: Current Medications Documented Detail Level: Detailed

## 2024-10-02 ENCOUNTER — TRANSCRIPTION ENCOUNTER (OUTPATIENT)
Age: 34
End: 2024-10-02

## 2024-10-15 ENCOUNTER — APPOINTMENT (OUTPATIENT)
Dept: OBGYN | Facility: CLINIC | Age: 34
End: 2024-10-15
Payer: COMMERCIAL

## 2024-10-15 VITALS
HEIGHT: 66 IN | HEART RATE: 91 BPM | WEIGHT: 165 LBS | TEMPERATURE: 98 F | BODY MASS INDEX: 26.52 KG/M2 | DIASTOLIC BLOOD PRESSURE: 78 MMHG | OXYGEN SATURATION: 99 % | SYSTOLIC BLOOD PRESSURE: 122 MMHG

## 2024-10-15 DIAGNOSIS — Z30.431 ENCOUNTER FOR ROUTINE CHECKING OF INTRAUTERINE CONTRACEPTIVE DEVICE: ICD-10-CM

## 2024-10-15 PROCEDURE — 99213 OFFICE O/P EST LOW 20 MIN: CPT | Mod: 25

## 2024-10-15 PROCEDURE — 76830 TRANSVAGINAL US NON-OB: CPT

## 2025-07-23 ENCOUNTER — NON-APPOINTMENT (OUTPATIENT)
Age: 35
End: 2025-07-23